# Patient Record
Sex: MALE | Race: WHITE | NOT HISPANIC OR LATINO | URBAN - METROPOLITAN AREA
[De-identification: names, ages, dates, MRNs, and addresses within clinical notes are randomized per-mention and may not be internally consistent; named-entity substitution may affect disease eponyms.]

---

## 2020-01-26 ASSESSMENT — MIFFLIN-ST. JEOR: SCORE: 1672.95

## 2020-01-27 ENCOUNTER — SURGERY - HEALTHEAST (OUTPATIENT)
Dept: SURGERY | Facility: CLINIC | Age: 75
End: 2020-01-27

## 2020-01-27 ENCOUNTER — SURGERY - HEALTHEAST (OUTPATIENT)
Dept: SURGERY | Facility: HOSPITAL | Age: 75
End: 2020-01-27

## 2020-01-27 ENCOUNTER — ANESTHESIA - HEALTHEAST (OUTPATIENT)
Dept: SURGERY | Facility: HOSPITAL | Age: 75
End: 2020-01-27

## 2020-01-27 DIAGNOSIS — N20.1 URETERAL CALCULUS, LEFT: ICD-10-CM

## 2020-01-27 ASSESSMENT — MIFFLIN-ST. JEOR: SCORE: 1705.16

## 2021-05-24 ENCOUNTER — RECORDS - HEALTHEAST (OUTPATIENT)
Dept: ADMINISTRATIVE | Facility: CLINIC | Age: 76
End: 2021-05-24

## 2021-06-01 ENCOUNTER — RECORDS - HEALTHEAST (OUTPATIENT)
Dept: ADMINISTRATIVE | Facility: CLINIC | Age: 76
End: 2021-06-01

## 2021-06-04 VITALS — BODY MASS INDEX: 29.1 KG/M2 | WEIGHT: 207.9 LBS | HEIGHT: 71 IN

## 2021-06-05 NOTE — ANESTHESIA POSTPROCEDURE EVALUATION
Patient: Timur Bullard  Procedure(s):  CYSTOSCOPY, CYSTOLITHOLAPAXY wit HOLMIUM LASER, LEFT RETROGRADE PYELOGRAM, LEFT URETEROSCOPY, STONE EXTRACTION, LEFT URETERAL STENT PLACEMENT (Left)  Anesthesia type: general    Patient location: PACU  Last vitals:   Vitals Value Taken Time   /76 1/27/2020 12:00 PM   Temp 36.5  C (97.7  F) 1/27/2020 11:42 AM   Pulse 84 1/27/2020 12:06 PM   Resp 15 1/27/2020 12:06 PM   SpO2 95 % 1/27/2020 12:06 PM   Vitals shown include unvalidated device data.  Post vital signs: stable  Level of consciousness: awake and responds to simple questions  Post-anesthesia pain: pain controlled  Post-anesthesia nausea and vomiting: no  Pulmonary: unassisted, return to baseline  Cardiovascular: stable and blood pressure at baseline  Hydration: adequate  Anesthetic events: no    QCDR Measures:  ASA# 11 - Alfreda-op Cardiac Arrest: ASA11B - Patient did NOT experience unanticipated cardiac arrest  ASA# 12 - Alfreda-op Mortality Rate: ASA12B - Patient did NOT die  ASA# 13 - PACU Re-Intubation Rate: ASA13B - Patient did NOT require a new airway mgmt  ASA# 10 - Composite Anes Safety: ASA10A - No serious adverse event    Additional Notes:

## 2021-06-05 NOTE — ANESTHESIA CARE TRANSFER NOTE
Last vitals:   Vitals:    01/27/20 1142   BP: 138/77   Pulse: 92   Resp: 16   Temp: 36.5  C (97.7  F)   SpO2: 100%     Patient's level of consciousness is drowsy  Spontaneous respirations: yes  Maintains airway independently: yes  Dentition unchanged: yes  Oropharynx: oropharynx clear of all foreign objects    QCDR Measures:  ASA# 20 - Surgical Safety Checklist: WHO surgical safety checklist completed prior to induction    PQRS# 430 - Adult PONV Prevention: 4558F - Pt received => 2 anti-emetic agents (different classes) preop & intraop  ASA# 8 - Peds PONV Prevention: 4558F - Pt received => 2 anti-emetic agents (different classes) preop & intraop  PQRS# 424 - Alfreda-op Temp Management: 4559F - At least one body temp DOCUMENTED => 35.5C or 95.9F within required timeframe  PQRS# 426 - PACU Transfer Protocol: - Transfer of care checklist used  ASA# 14 - Acute Post-op Pain: ASA14B - Patient did NOT experience pain >= 7 out of 10

## 2021-06-05 NOTE — ANESTHESIA PREPROCEDURE EVALUATION
Anesthesia Evaluation      Patient summary reviewed   No history of anesthetic complications     Airway   Mallampati: II  Comment: Overbite   Pulmonary - negative ROS and normal exam                          Cardiovascular   Exercise tolerance: > or = 4 METS  (+) hypertension, ,      Neuro/Psych - negative ROS     Endo/Other - negative ROS   Hypothyroidism: goiter.     GI/Hepatic/Renal - negative ROS           Dental - normal exam                          Anesthesia Plan  Planned anesthetic: general LMA    ASA 1   Induction: intravenous   Anesthetic plan and risks discussed with: patient  Anesthesia plan special considerations: antiemetics,   Post-op plan: routine recovery

## 2021-06-16 PROBLEM — N20.1 URETERAL CALCULUS, LEFT: Status: ACTIVE | Noted: 2020-01-26

## 2021-06-16 PROBLEM — E11.65 TYPE 2 DIABETES MELLITUS WITH HYPERGLYCEMIA, WITHOUT LONG-TERM CURRENT USE OF INSULIN (H): Status: ACTIVE | Noted: 2020-01-30

## 2021-06-16 PROBLEM — N17.9 AKI (ACUTE KIDNEY INJURY) (H): Status: ACTIVE | Noted: 2020-01-26

## 2021-10-29 DIAGNOSIS — Z11.59 ENCOUNTER FOR SCREENING FOR OTHER VIRAL DISEASES: ICD-10-CM

## 2021-11-15 ENCOUNTER — HOSPITAL ENCOUNTER (OUTPATIENT)
Facility: HOSPITAL | Age: 76
Discharge: HOME | End: 2021-11-15
Attending: UROLOGY | Admitting: UROLOGY
Payer: COMMERCIAL

## 2021-11-15 ENCOUNTER — APPOINTMENT (OUTPATIENT)
Dept: RADIOLOGY | Facility: HOSPITAL | Age: 76
End: 2021-11-15
Attending: UROLOGY
Payer: COMMERCIAL

## 2021-11-15 ENCOUNTER — ANESTHESIA EVENT (OUTPATIENT)
Dept: SURGERY | Facility: HOSPITAL | Age: 76
End: 2021-11-15
Payer: COMMERCIAL

## 2021-11-15 ENCOUNTER — ANESTHESIA (OUTPATIENT)
Dept: SURGERY | Facility: HOSPITAL | Age: 76
End: 2021-11-15
Payer: COMMERCIAL

## 2021-11-15 VITALS
OXYGEN SATURATION: 97 % | TEMPERATURE: 98.9 F | BODY MASS INDEX: 27.18 KG/M2 | WEIGHT: 194.9 LBS | RESPIRATION RATE: 24 BRPM | HEART RATE: 59 BPM | DIASTOLIC BLOOD PRESSURE: 73 MMHG | SYSTOLIC BLOOD PRESSURE: 152 MMHG

## 2021-11-15 DIAGNOSIS — N20.1 LEFT URETERAL STONE: Primary | ICD-10-CM

## 2021-11-15 DIAGNOSIS — N20.0 KIDNEY STONE: ICD-10-CM

## 2021-11-15 LAB
GLUCOSE BLDC GLUCOMTR-MCNC: 160 MG/DL (ref 70–99)
GLUCOSE BLDC GLUCOMTR-MCNC: 161 MG/DL (ref 70–99)

## 2021-11-15 PROCEDURE — 360N000083 HC SURGERY LEVEL 3 W/ FLUORO, PER MIN: Performed by: UROLOGY

## 2021-11-15 PROCEDURE — 272N000001 HC OR GENERAL SUPPLY STERILE: Performed by: UROLOGY

## 2021-11-15 PROCEDURE — C2617 STENT, NON-COR, TEM W/O DEL: HCPCS | Performed by: UROLOGY

## 2021-11-15 PROCEDURE — 250N000009 HC RX 250: Performed by: UROLOGY

## 2021-11-15 PROCEDURE — 258N000003 HC RX IP 258 OP 636: Performed by: ANESTHESIOLOGY

## 2021-11-15 PROCEDURE — 999N000182 XR SURGERY CARM FLUORO GREATER THAN 5 MIN

## 2021-11-15 PROCEDURE — 250N000011 HC RX IP 250 OP 636: Performed by: STUDENT IN AN ORGANIZED HEALTH CARE EDUCATION/TRAINING PROGRAM

## 2021-11-15 PROCEDURE — C1769 GUIDE WIRE: HCPCS | Performed by: UROLOGY

## 2021-11-15 PROCEDURE — 999N000141 HC STATISTIC PRE-PROCEDURE NURSING ASSESSMENT: Performed by: UROLOGY

## 2021-11-15 PROCEDURE — 82365 CALCULUS SPECTROSCOPY: CPT | Performed by: UROLOGY

## 2021-11-15 PROCEDURE — 710N000012 HC RECOVERY PHASE 2, PER MINUTE: Performed by: UROLOGY

## 2021-11-15 PROCEDURE — 370N000017 HC ANESTHESIA TECHNICAL FEE, PER MIN: Performed by: UROLOGY

## 2021-11-15 PROCEDURE — 250N000011 HC RX IP 250 OP 636: Performed by: NURSE PRACTITIONER

## 2021-11-15 PROCEDURE — C1894 INTRO/SHEATH, NON-LASER: HCPCS | Performed by: UROLOGY

## 2021-11-15 PROCEDURE — 710N000009 HC RECOVERY PHASE 1, LEVEL 1, PER MIN: Performed by: UROLOGY

## 2021-11-15 PROCEDURE — 82962 GLUCOSE BLOOD TEST: CPT

## 2021-11-15 PROCEDURE — 250N000025 HC SEVOFLURANE, PER MIN: Performed by: UROLOGY

## 2021-11-15 PROCEDURE — 258N000003 HC RX IP 258 OP 636: Performed by: STUDENT IN AN ORGANIZED HEALTH CARE EDUCATION/TRAINING PROGRAM

## 2021-11-15 PROCEDURE — 255N000002 HC RX 255 OP 636: Performed by: UROLOGY

## 2021-11-15 PROCEDURE — 250N000009 HC RX 250: Performed by: STUDENT IN AN ORGANIZED HEALTH CARE EDUCATION/TRAINING PROGRAM

## 2021-11-15 DEVICE — URETERAL STENT
Type: IMPLANTABLE DEVICE | Site: BLADDER | Status: FUNCTIONAL
Brand: PERCUFLEX™ PLUS

## 2021-11-15 RX ORDER — FENTANYL CITRATE 50 UG/ML
25 INJECTION, SOLUTION INTRAMUSCULAR; INTRAVENOUS
Status: DISCONTINUED | OUTPATIENT
Start: 2021-11-15 | End: 2021-11-15 | Stop reason: HOSPADM

## 2021-11-15 RX ORDER — NALOXONE HYDROCHLORIDE 0.4 MG/ML
0.2 INJECTION, SOLUTION INTRAMUSCULAR; INTRAVENOUS; SUBCUTANEOUS
Status: DISCONTINUED | OUTPATIENT
Start: 2021-11-15 | End: 2021-11-15 | Stop reason: HOSPADM

## 2021-11-15 RX ORDER — SODIUM CHLORIDE, SODIUM LACTATE, POTASSIUM CHLORIDE, CALCIUM CHLORIDE 600; 310; 30; 20 MG/100ML; MG/100ML; MG/100ML; MG/100ML
INJECTION, SOLUTION INTRAVENOUS CONTINUOUS
Status: DISCONTINUED | OUTPATIENT
Start: 2021-11-15 | End: 2021-11-15 | Stop reason: HOSPADM

## 2021-11-15 RX ORDER — ONDANSETRON 4 MG/1
4 TABLET, ORALLY DISINTEGRATING ORAL EVERY 30 MIN PRN
Status: DISCONTINUED | OUTPATIENT
Start: 2021-11-15 | End: 2021-11-15 | Stop reason: HOSPADM

## 2021-11-15 RX ORDER — HYDROMORPHONE HCL IN WATER/PF 6 MG/30 ML
0.2 PATIENT CONTROLLED ANALGESIA SYRINGE INTRAVENOUS EVERY 5 MIN PRN
Status: DISCONTINUED | OUTPATIENT
Start: 2021-11-15 | End: 2021-11-15 | Stop reason: HOSPADM

## 2021-11-15 RX ORDER — HYDROCODONE BITARTRATE AND ACETAMINOPHEN 5; 325 MG/1; MG/1
1 TABLET ORAL EVERY 4 HOURS PRN
Status: DISCONTINUED | OUTPATIENT
Start: 2021-11-15 | End: 2021-11-15 | Stop reason: HOSPADM

## 2021-11-15 RX ORDER — HYDROCODONE BITARTRATE AND ACETAMINOPHEN 5; 325 MG/1; MG/1
1 TABLET ORAL EVERY 4 HOURS PRN
Qty: 10 TABLET | Refills: 0 | Status: ON HOLD | OUTPATIENT
Start: 2021-11-15 | End: 2024-07-18

## 2021-11-15 RX ORDER — EPHEDRINE SULFATE 50 MG/ML
INJECTION, SOLUTION INTRAMUSCULAR; INTRAVENOUS; SUBCUTANEOUS PRN
Status: DISCONTINUED | OUTPATIENT
Start: 2021-11-15 | End: 2021-11-15

## 2021-11-15 RX ORDER — PHENAZOPYRIDINE HYDROCHLORIDE 200 MG/1
200 TABLET, FILM COATED ORAL 3 TIMES DAILY PRN
Qty: 30 TABLET | Refills: 1 | Status: ON HOLD | OUTPATIENT
Start: 2021-11-15 | End: 2024-07-18

## 2021-11-15 RX ORDER — METOPROLOL SUCCINATE 25 MG/1
25 TABLET, EXTENDED RELEASE ORAL DAILY
COMMUNITY

## 2021-11-15 RX ORDER — ACETAMINOPHEN 325 MG/1
650 TABLET ORAL ONCE
Status: DISCONTINUED | OUTPATIENT
Start: 2021-11-15 | End: 2021-11-15 | Stop reason: HOSPADM

## 2021-11-15 RX ORDER — PROPOFOL 10 MG/ML
INJECTION, EMULSION INTRAVENOUS PRN
Status: DISCONTINUED | OUTPATIENT
Start: 2021-11-15 | End: 2021-11-15

## 2021-11-15 RX ORDER — NALOXONE HYDROCHLORIDE 0.4 MG/ML
0.4 INJECTION, SOLUTION INTRAMUSCULAR; INTRAVENOUS; SUBCUTANEOUS
Status: DISCONTINUED | OUTPATIENT
Start: 2021-11-15 | End: 2021-11-15 | Stop reason: HOSPADM

## 2021-11-15 RX ORDER — CEFAZOLIN SODIUM 2 G/100ML
2 INJECTION, SOLUTION INTRAVENOUS SEE ADMIN INSTRUCTIONS
Status: DISCONTINUED | OUTPATIENT
Start: 2021-11-15 | End: 2021-11-15 | Stop reason: HOSPADM

## 2021-11-15 RX ORDER — DEXAMETHASONE SODIUM PHOSPHATE 4 MG/ML
INJECTION, SOLUTION INTRA-ARTICULAR; INTRALESIONAL; INTRAMUSCULAR; INTRAVENOUS; SOFT TISSUE PRN
Status: DISCONTINUED | OUTPATIENT
Start: 2021-11-15 | End: 2021-11-15

## 2021-11-15 RX ORDER — MEPERIDINE HYDROCHLORIDE 25 MG/ML
12.5 INJECTION INTRAMUSCULAR; INTRAVENOUS; SUBCUTANEOUS
Status: DISCONTINUED | OUTPATIENT
Start: 2021-11-15 | End: 2021-11-15 | Stop reason: HOSPADM

## 2021-11-15 RX ORDER — LIDOCAINE HYDROCHLORIDE 20 MG/ML
JELLY TOPICAL 2 TIMES DAILY
Qty: 1 ML | Refills: 3 | Status: ON HOLD | OUTPATIENT
Start: 2021-11-15 | End: 2024-07-18

## 2021-11-15 RX ORDER — FENTANYL CITRATE 50 UG/ML
25 INJECTION, SOLUTION INTRAMUSCULAR; INTRAVENOUS EVERY 5 MIN PRN
Status: DISCONTINUED | OUTPATIENT
Start: 2021-11-15 | End: 2021-11-15 | Stop reason: HOSPADM

## 2021-11-15 RX ORDER — LIDOCAINE 40 MG/G
CREAM TOPICAL
Status: DISCONTINUED | OUTPATIENT
Start: 2021-11-15 | End: 2021-11-15 | Stop reason: HOSPADM

## 2021-11-15 RX ORDER — ONDANSETRON 2 MG/ML
4 INJECTION INTRAMUSCULAR; INTRAVENOUS EVERY 30 MIN PRN
Status: DISCONTINUED | OUTPATIENT
Start: 2021-11-15 | End: 2021-11-15 | Stop reason: HOSPADM

## 2021-11-15 RX ORDER — CEFAZOLIN SODIUM 2 G/100ML
2 INJECTION, SOLUTION INTRAVENOUS
Status: COMPLETED | OUTPATIENT
Start: 2021-11-15 | End: 2021-11-15

## 2021-11-15 RX ORDER — FENTANYL CITRATE 50 UG/ML
INJECTION, SOLUTION INTRAMUSCULAR; INTRAVENOUS PRN
Status: DISCONTINUED | OUTPATIENT
Start: 2021-11-15 | End: 2021-11-15

## 2021-11-15 RX ORDER — GLYCOPYRROLATE 0.2 MG/ML
INJECTION, SOLUTION INTRAMUSCULAR; INTRAVENOUS PRN
Status: DISCONTINUED | OUTPATIENT
Start: 2021-11-15 | End: 2021-11-15

## 2021-11-15 RX ORDER — LIDOCAINE HYDROCHLORIDE 20 MG/ML
INJECTION, SOLUTION INFILTRATION; PERINEURAL PRN
Status: DISCONTINUED | OUTPATIENT
Start: 2021-11-15 | End: 2021-11-15

## 2021-11-15 RX ORDER — ONDANSETRON 2 MG/ML
INJECTION INTRAMUSCULAR; INTRAVENOUS PRN
Status: DISCONTINUED | OUTPATIENT
Start: 2021-11-15 | End: 2021-11-15

## 2021-11-15 RX ADMIN — ONDANSETRON 4 MG: 2 INJECTION INTRAMUSCULAR; INTRAVENOUS at 11:55

## 2021-11-15 RX ADMIN — SODIUM CHLORIDE, POTASSIUM CHLORIDE, SODIUM LACTATE AND CALCIUM CHLORIDE: 600; 310; 30; 20 INJECTION, SOLUTION INTRAVENOUS at 10:08

## 2021-11-15 RX ADMIN — DEXAMETHASONE SODIUM PHOSPHATE 4 MG: 4 INJECTION, SOLUTION INTRA-ARTICULAR; INTRALESIONAL; INTRAMUSCULAR; INTRAVENOUS; SOFT TISSUE at 11:19

## 2021-11-15 RX ADMIN — MIDAZOLAM HYDROCHLORIDE 2 MG: 1 INJECTION, SOLUTION INTRAMUSCULAR; INTRAVENOUS at 11:07

## 2021-11-15 RX ADMIN — FENTANYL CITRATE 50 MCG: 50 INJECTION, SOLUTION INTRAMUSCULAR; INTRAVENOUS at 11:45

## 2021-11-15 RX ADMIN — LIDOCAINE HYDROCHLORIDE 60 MG: 20 INJECTION, SOLUTION INFILTRATION; PERINEURAL at 11:19

## 2021-11-15 RX ADMIN — GLYCOPYRROLATE 0.3 MG: 0.2 INJECTION, SOLUTION INTRAMUSCULAR; INTRAVENOUS at 11:30

## 2021-11-15 RX ADMIN — PHENYLEPHRINE HYDROCHLORIDE 100 MCG: 10 INJECTION INTRAVENOUS at 11:24

## 2021-11-15 RX ADMIN — Medication 10 MG: at 11:36

## 2021-11-15 RX ADMIN — PHENYLEPHRINE HYDROCHLORIDE 100 MCG: 10 INJECTION INTRAVENOUS at 11:56

## 2021-11-15 RX ADMIN — FENTANYL CITRATE 100 MCG: 50 INJECTION, SOLUTION INTRAMUSCULAR; INTRAVENOUS at 11:19

## 2021-11-15 RX ADMIN — PROPOFOL 200 MG: 10 INJECTION, EMULSION INTRAVENOUS at 11:19

## 2021-11-15 RX ADMIN — PHENYLEPHRINE HYDROCHLORIDE 50 MCG: 10 INJECTION INTRAVENOUS at 11:31

## 2021-11-15 RX ADMIN — CEFAZOLIN SODIUM 2 G: 2 INJECTION, SOLUTION INTRAVENOUS at 11:16

## 2021-11-15 NOTE — ANESTHESIA POSTPROCEDURE EVALUATION
Patient: Timur Bullard    Procedure: Procedure(s):  CYSTOSCOPY, REMOVAL OF BLADDER STONES  RIGHT URETEROSCOPY, WITH HOLMIUM LASER LITHOTRIPSY, STONE EXTRACTION AND DOUBLE J  URETERAL STENT PLACEMENT RIGHT       Diagnosis:Calculus of kidney [N20.0]  Diagnosis Additional Information: No value filed.    Anesthesia Type:  General    Note:  Disposition: Outpatient   Postop Pain Control: Uneventful            Sign Out: Well controlled pain   PONV: No   Neuro/Psych: Uneventful            Sign Out: Acceptable/Baseline neuro status   Airway/Respiratory: Uneventful            Sign Out: Acceptable/Baseline resp. status   CV/Hemodynamics: Uneventful            Sign Out: Acceptable CV status; No obvious hypovolemia; No obvious fluid overload   Other NRE: NONE   DID A NON-ROUTINE EVENT OCCUR? No           Last vitals:  Vitals Value Taken Time   /78 11/15/21 1315   Temp 37.2  C (98.9  F) 11/15/21 1227   Pulse 54 11/15/21 1325   Resp 21 11/15/21 1319   SpO2 98 % 11/15/21 1325   Vitals shown include unvalidated device data.    Electronically Signed By: Carl Michelle MD  November 15, 2021  3:29 PM

## 2021-11-15 NOTE — ANESTHESIA CARE TRANSFER NOTE
Patient: Timur Bullard    Procedure: Procedure(s):  CYSTOSCOPY, REMOVAL OF BLADDER STONES  RIGHT URETEROSCOPY, WITH HOLMIUM LASER LITHOTRIPSY AND DOUBLE J  URETERAL STENT PLACEMENT RIGHT       Diagnosis: Calculus of kidney [N20.0]  Diagnosis Additional Information: No value filed.    Anesthesia Type:   General     Note:BP (!) 156/78   Pulse 74   Temp 37.2  C (98.9  F) (Temporal)   Resp 18   Wt 88.4 kg (194 lb 14.4 oz)   SpO2 99%   BMI 27.18 kg/m        Oropharynx: oropharynx clear of all foreign objects  Level of Consciousness: awake  Oxygen Supplementation: face mask  Level of Supplemental Oxygen (L/min / FiO2): 8  Independent Airway: airway patency satisfactory and stable  Dentition: dentition unchanged  Vital Signs Stable: post-procedure vital signs reviewed and stable  Report to RN Given: handoff report given  Patient transferred to: PACU    Handoff Report: Identifed the Patient, Identified the Reponsible Provider, Reviewed the pertinent medical history, Discussed the surgical course, Reviewed Intra-OP anesthesia mangement and issues during anesthesia, Set expectations for post-procedure period and Allowed opportunity for questions and acknowledgement of understanding      Vitals:  Vitals Value Taken Time   /78 11/15/21 1227   Temp 37.2  C (98.9  F) 11/15/21 1227   Pulse 70 11/15/21 1230   Resp 9 11/15/21 1230   SpO2 100 % 11/15/21 1230   Vitals shown include unvalidated device data.    Electronically Signed By: Darline Perry CRNA, KINSEY BENITEZ  November 15, 2021  12:30 PM

## 2021-11-15 NOTE — OP NOTE
Date of surgery: 11/15/2021    Location: [facility] [place]    Operating room: 6    Surgeon: Dr. Adithya Quinones.     Assistant: None    Anesthesia: General    Preoperative diagnosis: Bladder calculi and right renal calculi    Postoperative diagnosis: Same    Procedure: Cystoscopy, evacuation of bladder stones, right retrograde pyelogram, right ureteroscopy, holmium laser lithotripsy, right ureteral stent placement    Operative indication: Timur Bullard is a 76 year old male with a known bladder calculus and a diverticulum and a 9 mm right renal calculus who presents approximately 5 months after his most recent imaging for stone clearance.    Operative findings: There are 4 stones in a bladder diverticulum to the right of midline.  These are evacuated through the scope.  There were 2 stones in the right kidney, both of which required laser lithotripsy.  The stones are dusted.  Fragments larger than a millimeter are removed.    Operative procedure: The patient was brought to the operating room.  General anesthesia was administered.  The patient was placed in lithotomy position and prepared and draped in sterile fashion.  I introduced a 22 Somali cystoscope per urethra and into the bladder.  There are no urethral strictures.  The prostate is obstructive with bilobar hypertrophy.  There are several small diverticula in the bladder.  On the right side of the bladder, 1 of these diverticula contains 4 calculi.  I was able to remove 3 stones through the 22 Somali sheath.  I removed the scope and exchanged for a 25 Somali sheath.  I was then able to remove the largest of the bladder stones through the sheath.  At this point I introduced an 8 Somali coaxial catheter into the right ureteral orifice and performed a retrograde pyelogram.  There is no hydronephrosis.  There appeared to be faintly radiopaque calcifications in the right kidney.  The proximal ureter is fairly narrow.  A [employer] wire was advanced into the kidney and  the ureter is dilated with the 8 Israeli obturator and the 10 Israeli sheath.  I then introduced the 12 Israeli obturator and the 14 Israeli, 36 cm ureteral access sheath.  A second wire was placed and one of the wires is exteriorized by reinserting the sheath.  The other wire was removed.  Flexible ureteroscopy was undertaken.  The proximal ureter is quite narrow but does allow passage of the scope alongside the safety wire.  In the upper pole, I identified 2 stones in the same calyx.  I used the holmium laser with settings of 0.2 and 50 to dust the stone.  While the stone dusted reasonably well, there were still some larger fragments and these were removed with the stone basket.  At the completion of the procedure, visualization is reasonable and I do not see any stones larger than the diameter of the wire.  The sheath is removed and the ureter is inspected.  There are no stones or stone fragments.  The collecting system is filled with contrast and there is no extravasation.  There are no filling defects.  A 6 Israeli by 26 cm double-J stent is advanced over the wire.  It coils into the lower pole as seen by fluoroscopy and into the bladder as seen by direct vision.  The bladder was emptied and the procedure terminated.    Drains: Right ureteral stent    Specimens: Bladder stones and right renal stones    Estimated blood loss: 5 mL    Complications: None    Adithya Quinones MD

## 2021-11-15 NOTE — ANESTHESIA CARE TRANSFER NOTE
Patient: Timur Bullard    Procedure: Procedure(s):  CYSTOSCOPY, REMOVAL OF BLADDER STONES  RIGHT URETEROSCOPY, WITH HOLMIUM LASER LITHOTRIPSY AND DOUBLE J  URETERAL STENT PLACEMENT RIGHT       Diagnosis: Calculus of kidney [N20.0]  Diagnosis Additional Information: No value filed.    Anesthesia Type:   General     Note:  Anesthesia Care Transfer Notewriter  Vitals:  Vitals Value Taken Time   /79 11/15/21 1230   Temp 37.2  C (98.9  F) 11/15/21 1227   Pulse 70 11/15/21 1230   Resp 9 11/15/21 1230   SpO2 100 % 11/15/21 1230   Vitals shown include unvalidated device data.    Electronically Signed By: Darline Perry CRNA, APRN CRNA  November 15, 2021  12:31 PM

## 2021-11-15 NOTE — ANESTHESIA PREPROCEDURE EVALUATION
"Anesthesia Pre-Procedure Evaluation    Patient: Timur Bullard   MRN: [id number] : 1945        Preoperative Diagnosis: Calculus of kidney [N20.0]    Procedure : Procedure(s):  CYSTOSCOPY, CYSTOLITHOLAPAXY  RIGHT URETEROSCOPY, WITH HOLMIUM LASER LITHOTRIPSY AND DOUBLE J  URETERAL STENT PLACEMENT          Past Medical History:   Diagnosis Date     Hypertension      Type 2 diabetes mellitus with hyperglycemia, without long-term current use of insulin (H) 2020      Past Surgical History:   Procedure Laterality Date     CYSTOSCOPY  2015     CYSTOSCOPY  11/15/2021    cystolitholapaxy, right stent     KIDNEY SURGERY Right       Allergies   Allergen Reactions     Ciprofloxacin Other (See Comments)     \"I get loopy\"     Gadolinium-Containing Contrast Media [Gadolinium Derivatives] Hives     Percocet [Oxycodone-Acetaminophen] Other (See Comments)     Delusional       Social History     Tobacco Use     Smoking status: Former Smoker     Quit date: 1978     Years since quittin.9     Smokeless tobacco: Never Used   Substance Use Topics     Alcohol use: Not Currently     Comment: Alcoholic Drinks/day: Occasionally      Wt Readings from Last 1 Encounters:   11/15/21 88.4 kg (194 lb 14.4 oz)        Anesthesia Evaluation   Pt has had prior anesthetic.     History of anesthetic complications (very sore throat x 2 days)       ROS/MED HX  ENT/Pulmonary:    (-) sleep apnea and recent URI   Neurologic:  - neg neurologic ROS     Cardiovascular:     (+) hypertension-----    METS/Exercise Tolerance:     Hematologic:       Musculoskeletal:       GI/Hepatic:       Renal/Genitourinary:     (+) renal disease, type: ARF,     Endo:     (+) type I DM,     Psychiatric/Substance Use:       Infectious Disease:       Malignancy:       Other:            Physical Exam    Airway        Mallampati: II   TM distance: > 3 FB   Neck ROM: full   Mouth opening: > 3 cm    Respiratory Devices and Support         Dental       (+) " caps      Cardiovascular   cardiovascular exam normal          Pulmonary   pulmonary exam normal                OUTSIDE LABS:  CBC:   Lab Results   Component Value Date    WBC 10.3 01/28/2020    HGB 12.4 (L) 01/28/2020    HCT 36.7 (L) 01/28/2020     01/28/2020     BMP:   Lab Results   Component Value Date     01/28/2020     01/27/2020    POTASSIUM 4.3 01/28/2020    POTASSIUM 4.7 01/27/2020    CHLORIDE 109 (H) 01/28/2020    CHLORIDE 105 01/27/2020    CO2 23 01/28/2020    CO2 25 01/27/2020    BUN 17 01/28/2020    BUN 29 (H) 01/27/2020    CR 1.08 01/28/2020    CR 1.52 (H) 01/27/2020     (H) 11/15/2021     (H) 01/28/2020     COAGS: No results found for: PTT, INR, FIBR  POC: No results found for: BGM, HCG, HCGS  HEPATIC: No results found for: ALBUMIN, PROTTOTAL, ALT, AST, GGT, ALKPHOS, BILITOTAL, BILIDIRECT, FARIDA  OTHER:   Lab Results   Component Value Date    A1C 9.2 (H) 01/28/2020    SUSAN 8.5 01/28/2020    MAG 1.8 01/28/2020    TSH 0.99 05/02/2008    T4 0.96 05/02/2008    T3 172 05/02/2008       Anesthesia Plan    ASA Status:  3      Anesthesia Type: General.     - Airway: LMA              Consents    Anesthesia Plan(s) and associated risks, benefits, and realistic alternatives discussed. Questions answered and patient/representative(s) expressed understanding.     - Discussed with:  Patient      - Patient is DNR/DNI Status: No         Postoperative Care    Pain management: IV analgesics, Oral pain medications, Multi-modal analgesia.   PONV prophylaxis: Ondansetron (or other 5HT-3), Dexamethasone or Solumedrol     Comments:                Carl Michelle MD

## 2021-11-15 NOTE — ANESTHESIA PROCEDURE NOTES
Airway       Patient location during procedure: OR  Staff -        Anesthesiologist:  Carl Michelle MD       CRNA: Brooke Viramontes APRN CRNA       Performed By: CRNA  Consent for Airway        Urgency: elective  Indications and Patient Condition       Indications for airway management: chauncey-procedural       Induction type:intravenous       Mask difficulty assessment: 0 - not attempted    Final Airway Details       Final airway type: supraglottic airway    Supraglottic Airway Details        Type: LMA       LMA size: 5    Post intubation assessment        Placement verified by: capnometry, equal breath sounds and chest rise        Number of attempts at approach: 1       Number of other approaches attempted: 0       Secured with: silk tape       Ease of procedure: easy       Dentition: Intact and Unchanged

## 2021-11-16 LAB
APPEARANCE STONE: NORMAL
APPEARANCE STONE: NORMAL
COMPN STONE: NORMAL
COMPN STONE: NORMAL
SPECIMEN WT: 35 MG
SPECIMEN WT: 485 MG

## 2023-03-29 ENCOUNTER — TRANSCRIBE ORDERS (OUTPATIENT)
Dept: OTHER | Age: 78
End: 2023-03-29

## 2023-06-19 ENCOUNTER — TRANSCRIBE ORDERS (OUTPATIENT)
Dept: OTHER | Age: 78
End: 2023-06-19

## 2023-06-19 DIAGNOSIS — Z95.5 S/P INSERTION OF NON-DRUG ELUTING CORONARY ARTERY STENT: Primary | ICD-10-CM

## 2024-07-18 ENCOUNTER — HOSPITAL ENCOUNTER (INPATIENT)
Facility: HOSPITAL | Age: 79
LOS: 3 days | Discharge: HOME | DRG: 390 | End: 2024-07-21
Attending: STUDENT IN AN ORGANIZED HEALTH CARE EDUCATION/TRAINING PROGRAM | Admitting: INTERNAL MEDICINE
Payer: COMMERCIAL

## 2024-07-18 DIAGNOSIS — R10.13 DYSPEPSIA: Primary | ICD-10-CM

## 2024-07-18 DIAGNOSIS — R19.7 DIARRHEA, UNSPECIFIED TYPE: ICD-10-CM

## 2024-07-18 PROBLEM — K56.609 SBO (SMALL BOWEL OBSTRUCTION) (H): Status: ACTIVE | Noted: 2024-07-18

## 2024-07-18 PROCEDURE — 120N000001 HC R&B MED SURG/OB

## 2024-07-18 PROCEDURE — 99418 PROLNG IP/OBS E/M EA 15 MIN: CPT | Performed by: INTERNAL MEDICINE

## 2024-07-18 PROCEDURE — 250N000011 HC RX IP 250 OP 636: Performed by: INTERNAL MEDICINE

## 2024-07-18 PROCEDURE — 250N000011 HC RX IP 250 OP 636: Performed by: STUDENT IN AN ORGANIZED HEALTH CARE EDUCATION/TRAINING PROGRAM

## 2024-07-18 PROCEDURE — 99223 1ST HOSP IP/OBS HIGH 75: CPT | Performed by: INTERNAL MEDICINE

## 2024-07-18 RX ORDER — ONDANSETRON 2 MG/ML
4 INJECTION INTRAMUSCULAR; INTRAVENOUS EVERY 6 HOURS PRN
Status: DISCONTINUED | OUTPATIENT
Start: 2024-07-18 | End: 2024-07-20

## 2024-07-18 RX ORDER — OMEGA-3/DHA/EPA/FISH OIL 60 MG-90MG
500 CAPSULE ORAL DAILY
Status: ON HOLD | COMMUNITY
End: 2024-07-21

## 2024-07-18 RX ORDER — LIDOCAINE 40 MG/G
CREAM TOPICAL
Status: DISCONTINUED | OUTPATIENT
Start: 2024-07-18 | End: 2024-07-21 | Stop reason: HOSPADM

## 2024-07-18 RX ORDER — AMOXICILLIN 250 MG
2 CAPSULE ORAL 2 TIMES DAILY PRN
Status: DISCONTINUED | OUTPATIENT
Start: 2024-07-18 | End: 2024-07-21 | Stop reason: HOSPADM

## 2024-07-18 RX ORDER — HYDRALAZINE HYDROCHLORIDE 20 MG/ML
10 INJECTION INTRAMUSCULAR; INTRAVENOUS EVERY 6 HOURS PRN
Status: DISCONTINUED | OUTPATIENT
Start: 2024-07-18 | End: 2024-07-19

## 2024-07-18 RX ORDER — NITROGLYCERIN 0.4 MG/1
0.4 TABLET SUBLINGUAL EVERY 5 MIN PRN
Status: DISCONTINUED | OUTPATIENT
Start: 2024-07-18 | End: 2024-07-21 | Stop reason: HOSPADM

## 2024-07-18 RX ORDER — POTASSIUM CITRATE 10 MEQ/1
20 TABLET, EXTENDED RELEASE ORAL 2 TIMES DAILY
Status: ON HOLD | COMMUNITY
End: 2024-07-21

## 2024-07-18 RX ORDER — LORAZEPAM 2 MG/ML
0.5 INJECTION INTRAMUSCULAR ONCE
Status: COMPLETED | OUTPATIENT
Start: 2024-07-18 | End: 2024-07-18

## 2024-07-18 RX ORDER — CALCIUM CARBONATE 500 MG/1
1000 TABLET, CHEWABLE ORAL 4 TIMES DAILY PRN
Status: DISCONTINUED | OUTPATIENT
Start: 2024-07-18 | End: 2024-07-21 | Stop reason: HOSPADM

## 2024-07-18 RX ORDER — PROCHLORPERAZINE MALEATE 5 MG
5 TABLET ORAL EVERY 6 HOURS PRN
Status: DISCONTINUED | OUTPATIENT
Start: 2024-07-18 | End: 2024-07-21 | Stop reason: HOSPADM

## 2024-07-18 RX ORDER — AMOXICILLIN 250 MG
1 CAPSULE ORAL 2 TIMES DAILY PRN
Status: DISCONTINUED | OUTPATIENT
Start: 2024-07-18 | End: 2024-07-21 | Stop reason: HOSPADM

## 2024-07-18 RX ORDER — NALOXONE HYDROCHLORIDE 0.4 MG/ML
0.2 INJECTION, SOLUTION INTRAMUSCULAR; INTRAVENOUS; SUBCUTANEOUS
Status: DISCONTINUED | OUTPATIENT
Start: 2024-07-18 | End: 2024-07-21 | Stop reason: HOSPADM

## 2024-07-18 RX ORDER — NITROGLYCERIN 0.4 MG/1
0.4 TABLET SUBLINGUAL EVERY 5 MIN PRN
COMMUNITY

## 2024-07-18 RX ORDER — LEVOCETIRIZINE DIHYDROCHLORIDE 5 MG/1
5 TABLET, FILM COATED ORAL
Status: ON HOLD | COMMUNITY
End: 2024-07-21

## 2024-07-18 RX ORDER — VERAPAMIL HYDROCHLORIDE 120 MG/1
120 CAPSULE, EXTENDED RELEASE ORAL EVERY MORNING
COMMUNITY

## 2024-07-18 RX ORDER — DEXTROSE, SODIUM CHLORIDE, SODIUM LACTATE, POTASSIUM CHLORIDE, AND CALCIUM CHLORIDE 5; .6; .31; .03; .02 G/100ML; G/100ML; G/100ML; G/100ML; G/100ML
INJECTION, SOLUTION INTRAVENOUS CONTINUOUS
Status: DISCONTINUED | OUTPATIENT
Start: 2024-07-18 | End: 2024-07-19

## 2024-07-18 RX ORDER — PROCHLORPERAZINE 25 MG
12.5 SUPPOSITORY, RECTAL RECTAL EVERY 12 HOURS PRN
Status: DISCONTINUED | OUTPATIENT
Start: 2024-07-18 | End: 2024-07-21 | Stop reason: HOSPADM

## 2024-07-18 RX ORDER — HYDRALAZINE HYDROCHLORIDE 20 MG/ML
10 INJECTION INTRAMUSCULAR; INTRAVENOUS EVERY 4 HOURS PRN
Status: DISCONTINUED | OUTPATIENT
Start: 2024-07-18 | End: 2024-07-21 | Stop reason: HOSPADM

## 2024-07-18 RX ORDER — ONDANSETRON 2 MG/ML
4 INJECTION INTRAMUSCULAR; INTRAVENOUS EVERY 6 HOURS PRN
Status: DISCONTINUED | OUTPATIENT
Start: 2024-07-18 | End: 2024-07-21 | Stop reason: HOSPADM

## 2024-07-18 RX ORDER — HYDROMORPHONE HCL IN WATER/PF 6 MG/30 ML
0.2 PATIENT CONTROLLED ANALGESIA SYRINGE INTRAVENOUS
Status: DISCONTINUED | OUTPATIENT
Start: 2024-07-18 | End: 2024-07-21 | Stop reason: HOSPADM

## 2024-07-18 RX ORDER — ONDANSETRON 4 MG/1
4 TABLET, ORALLY DISINTEGRATING ORAL EVERY 6 HOURS PRN
Status: DISCONTINUED | OUTPATIENT
Start: 2024-07-18 | End: 2024-07-21 | Stop reason: HOSPADM

## 2024-07-18 RX ORDER — NALOXONE HYDROCHLORIDE 0.4 MG/ML
0.4 INJECTION, SOLUTION INTRAMUSCULAR; INTRAVENOUS; SUBCUTANEOUS
Status: DISCONTINUED | OUTPATIENT
Start: 2024-07-18 | End: 2024-07-21 | Stop reason: HOSPADM

## 2024-07-18 RX ORDER — HYDROMORPHONE HCL IN WATER/PF 6 MG/30 ML
0.4 PATIENT CONTROLLED ANALGESIA SYRINGE INTRAVENOUS
Status: DISCONTINUED | OUTPATIENT
Start: 2024-07-18 | End: 2024-07-21 | Stop reason: HOSPADM

## 2024-07-18 RX ORDER — ALBUTEROL SULFATE 90 UG/1
1-2 AEROSOL, METERED RESPIRATORY (INHALATION) EVERY 4 HOURS PRN
Status: DISCONTINUED | OUTPATIENT
Start: 2024-07-18 | End: 2024-07-21 | Stop reason: HOSPADM

## 2024-07-18 RX ADMIN — HYDRALAZINE HYDROCHLORIDE 10 MG: 20 INJECTION INTRAMUSCULAR; INTRAVENOUS at 21:24

## 2024-07-18 RX ADMIN — FAMOTIDINE 20 MG: 10 INJECTION, SOLUTION INTRAVENOUS at 22:14

## 2024-07-18 RX ADMIN — SODIUM CHLORIDE, SODIUM LACTATE, POTASSIUM CHLORIDE, CALCIUM CHLORIDE AND DEXTROSE MONOHYDRATE: 5; 600; 310; 30; 20 INJECTION, SOLUTION INTRAVENOUS at 22:20

## 2024-07-18 RX ADMIN — PROCHLORPERAZINE EDISYLATE 5 MG: 5 INJECTION INTRAMUSCULAR; INTRAVENOUS at 21:15

## 2024-07-18 RX ADMIN — LORAZEPAM 0.5 MG: 2 INJECTION INTRAMUSCULAR; INTRAVENOUS at 23:33

## 2024-07-18 ASSESSMENT — ACTIVITIES OF DAILY LIVING (ADL)
ADLS_ACUITY_SCORE: 35

## 2024-07-19 ENCOUNTER — APPOINTMENT (OUTPATIENT)
Dept: CT IMAGING | Facility: HOSPITAL | Age: 79
DRG: 390 | End: 2024-07-19
Attending: INTERNAL MEDICINE
Payer: COMMERCIAL

## 2024-07-19 LAB
ALBUMIN SERPL BCG-MCNC: 4.1 G/DL (ref 3.5–5.2)
ALP SERPL-CCNC: 65 U/L (ref 40–150)
ALT SERPL W P-5'-P-CCNC: 27 U/L (ref 0–70)
ANION GAP SERPL CALCULATED.3IONS-SCNC: 11 MMOL/L (ref 7–15)
AST SERPL W P-5'-P-CCNC: 19 U/L (ref 0–45)
BASOPHILS # BLD AUTO: 0 10E3/UL (ref 0–0.2)
BASOPHILS NFR BLD AUTO: 0 %
BILIRUB SERPL-MCNC: 1.8 MG/DL
BUN SERPL-MCNC: 18.3 MG/DL (ref 8–23)
CALCIUM SERPL-MCNC: 8.7 MG/DL (ref 8.8–10.4)
CHLORIDE SERPL-SCNC: 104 MMOL/L (ref 98–107)
CREAT SERPL-MCNC: 0.91 MG/DL (ref 0.67–1.17)
EGFRCR SERPLBLD CKD-EPI 2021: 87 ML/MIN/1.73M2
EOSINOPHIL # BLD AUTO: 0.1 10E3/UL (ref 0–0.7)
EOSINOPHIL NFR BLD AUTO: 1 %
ERYTHROCYTE [DISTWIDTH] IN BLOOD BY AUTOMATED COUNT: 13.9 % (ref 10–15)
GLUCOSE BLDC GLUCOMTR-MCNC: 153 MG/DL (ref 70–99)
GLUCOSE BLDC GLUCOMTR-MCNC: 169 MG/DL (ref 70–99)
GLUCOSE BLDC GLUCOMTR-MCNC: 169 MG/DL (ref 70–99)
GLUCOSE BLDC GLUCOMTR-MCNC: 215 MG/DL (ref 70–99)
GLUCOSE SERPL-MCNC: 196 MG/DL (ref 70–99)
HAV IGM SERPL QL IA: NONREACTIVE
HBV CORE IGM SERPL QL IA: NONREACTIVE
HBV SURFACE AG SERPL QL IA: NONREACTIVE
HCO3 SERPL-SCNC: 24 MMOL/L (ref 22–29)
HCT VFR BLD AUTO: 42.3 % (ref 40–53)
HCV AB SERPL QL IA: NONREACTIVE
HGB BLD-MCNC: 14.2 G/DL (ref 13.3–17.7)
IMM GRANULOCYTES # BLD: 0 10E3/UL
IMM GRANULOCYTES NFR BLD: 0 %
LYMPHOCYTES # BLD AUTO: 0.6 10E3/UL (ref 0.8–5.3)
LYMPHOCYTES NFR BLD AUTO: 6 %
MAGNESIUM SERPL-MCNC: 1.9 MG/DL (ref 1.7–2.3)
MCH RBC QN AUTO: 27.7 PG (ref 26.5–33)
MCHC RBC AUTO-ENTMCNC: 33.6 G/DL (ref 31.5–36.5)
MCV RBC AUTO: 83 FL (ref 78–100)
MONOCYTES # BLD AUTO: 1.3 10E3/UL (ref 0–1.3)
MONOCYTES NFR BLD AUTO: 14 %
NEUTROPHILS # BLD AUTO: 7.6 10E3/UL (ref 1.6–8.3)
NEUTROPHILS NFR BLD AUTO: 79 %
NRBC # BLD AUTO: 0 10E3/UL
NRBC BLD AUTO-RTO: 0 /100
PLATELET # BLD AUTO: ABNORMAL 10*3/UL
POTASSIUM SERPL-SCNC: 4.2 MMOL/L (ref 3.4–5.3)
PROT SERPL-MCNC: 6.4 G/DL (ref 6.4–8.3)
RBC # BLD AUTO: 5.13 10E6/UL (ref 4.4–5.9)
SODIUM SERPL-SCNC: 139 MMOL/L (ref 135–145)
WBC # BLD AUTO: 9.6 10E3/UL (ref 4–11)

## 2024-07-19 PROCEDURE — 250N000011 HC RX IP 250 OP 636: Performed by: INTERNAL MEDICINE

## 2024-07-19 PROCEDURE — 74178 CT ABD&PLV WO CNTR FLWD CNTR: CPT

## 2024-07-19 PROCEDURE — 80074 ACUTE HEPATITIS PANEL: CPT | Performed by: INTERNAL MEDICINE

## 2024-07-19 PROCEDURE — 83735 ASSAY OF MAGNESIUM: CPT | Performed by: STUDENT IN AN ORGANIZED HEALTH CARE EDUCATION/TRAINING PROGRAM

## 2024-07-19 PROCEDURE — 84450 TRANSFERASE (AST) (SGOT): CPT | Performed by: INTERNAL MEDICINE

## 2024-07-19 PROCEDURE — 36415 COLL VENOUS BLD VENIPUNCTURE: CPT | Performed by: INTERNAL MEDICINE

## 2024-07-19 PROCEDURE — 250N000012 HC RX MED GY IP 250 OP 636 PS 637: Performed by: INTERNAL MEDICINE

## 2024-07-19 PROCEDURE — 250N000013 HC RX MED GY IP 250 OP 250 PS 637: Performed by: INTERNAL MEDICINE

## 2024-07-19 PROCEDURE — 85048 AUTOMATED LEUKOCYTE COUNT: CPT | Performed by: INTERNAL MEDICINE

## 2024-07-19 PROCEDURE — 82040 ASSAY OF SERUM ALBUMIN: CPT | Performed by: INTERNAL MEDICINE

## 2024-07-19 PROCEDURE — 99221 1ST HOSP IP/OBS SF/LOW 40: CPT | Performed by: SURGERY

## 2024-07-19 PROCEDURE — 120N000001 HC R&B MED SURG/OB

## 2024-07-19 PROCEDURE — 99233 SBSQ HOSP IP/OBS HIGH 50: CPT | Performed by: INTERNAL MEDICINE

## 2024-07-19 RX ORDER — PANTOPRAZOLE SODIUM 40 MG/1
40 TABLET, DELAYED RELEASE ORAL
Status: DISCONTINUED | OUTPATIENT
Start: 2024-07-19 | End: 2024-07-21 | Stop reason: HOSPADM

## 2024-07-19 RX ORDER — NICOTINE POLACRILEX 4 MG
15-30 LOZENGE BUCCAL
Status: DISCONTINUED | OUTPATIENT
Start: 2024-07-19 | End: 2024-07-21 | Stop reason: HOSPADM

## 2024-07-19 RX ORDER — MAGNESIUM HYDROXIDE/ALUMINUM HYDROXICE/SIMETHICONE 120; 1200; 1200 MG/30ML; MG/30ML; MG/30ML
15 SUSPENSION ORAL ONCE
Status: COMPLETED | OUTPATIENT
Start: 2024-07-19 | End: 2024-07-19

## 2024-07-19 RX ORDER — DIPHENHYDRAMINE HCL 50 MG
50 CAPSULE ORAL ONCE
Status: COMPLETED | OUTPATIENT
Start: 2024-07-19 | End: 2024-07-19

## 2024-07-19 RX ORDER — IOPAMIDOL 755 MG/ML
90 INJECTION, SOLUTION INTRAVASCULAR ONCE
Status: COMPLETED | OUTPATIENT
Start: 2024-07-19 | End: 2024-07-19

## 2024-07-19 RX ORDER — LIDOCAINE 40 MG/G
CREAM TOPICAL
OUTPATIENT
Start: 2024-07-19

## 2024-07-19 RX ORDER — DEXTROSE MONOHYDRATE 25 G/50ML
25-50 INJECTION, SOLUTION INTRAVENOUS
Status: DISCONTINUED | OUTPATIENT
Start: 2024-07-19 | End: 2024-07-21 | Stop reason: HOSPADM

## 2024-07-19 RX ORDER — MAGNESIUM SULFATE HEPTAHYDRATE 40 MG/ML
2 INJECTION, SOLUTION INTRAVENOUS ONCE
Status: COMPLETED | OUTPATIENT
Start: 2024-07-19 | End: 2024-07-19

## 2024-07-19 RX ORDER — DIPHENHYDRAMINE HYDROCHLORIDE 50 MG/ML
12.5 INJECTION INTRAMUSCULAR; INTRAVENOUS ONCE
Status: DISCONTINUED | OUTPATIENT
Start: 2024-07-19 | End: 2024-07-19

## 2024-07-19 RX ADMIN — FAMOTIDINE 20 MG: 10 INJECTION, SOLUTION INTRAVENOUS at 21:09

## 2024-07-19 RX ADMIN — DIPHENHYDRAMINE HYDROCHLORIDE 50 MG: 50 CAPSULE ORAL at 16:34

## 2024-07-19 RX ADMIN — CALCIUM CARBONATE (ANTACID) CHEW TAB 500 MG 1000 MG: 500 CHEW TAB at 03:41

## 2024-07-19 RX ADMIN — IOPAMIDOL 90 ML: 755 INJECTION, SOLUTION INTRAVENOUS at 17:51

## 2024-07-19 RX ADMIN — ALUMINUM HYDROXIDE, MAGNESIUM HYDROXIDE, AND SIMETHICONE 15 ML: 1200; 120; 1200 SUSPENSION ORAL at 04:13

## 2024-07-19 RX ADMIN — CALCIUM CARBONATE (ANTACID) CHEW TAB 500 MG 1000 MG: 500 CHEW TAB at 10:20

## 2024-07-19 RX ADMIN — MAGNESIUM SULFATE HEPTAHYDRATE 2 G: 40 INJECTION, SOLUTION INTRAVENOUS at 08:40

## 2024-07-19 RX ADMIN — HYDRALAZINE HYDROCHLORIDE 10 MG: 20 INJECTION INTRAMUSCULAR; INTRAVENOUS at 23:22

## 2024-07-19 RX ADMIN — INSULIN ASPART 1 UNITS: 100 INJECTION, SOLUTION INTRAVENOUS; SUBCUTANEOUS at 12:27

## 2024-07-19 RX ADMIN — INSULIN ASPART 1 UNITS: 100 INJECTION, SOLUTION INTRAVENOUS; SUBCUTANEOUS at 16:41

## 2024-07-19 RX ADMIN — SODIUM CHLORIDE, SODIUM LACTATE, POTASSIUM CHLORIDE, CALCIUM CHLORIDE AND DEXTROSE MONOHYDRATE: 5; 600; 310; 30; 20 INJECTION, SOLUTION INTRAVENOUS at 08:15

## 2024-07-19 RX ADMIN — FAMOTIDINE 20 MG: 10 INJECTION, SOLUTION INTRAVENOUS at 10:15

## 2024-07-19 RX ADMIN — PREDNISONE 50 MG: 20 TABLET ORAL at 13:19

## 2024-07-19 RX ADMIN — PANTOPRAZOLE SODIUM 40 MG: 40 TABLET, DELAYED RELEASE ORAL at 11:51

## 2024-07-19 ASSESSMENT — ACTIVITIES OF DAILY LIVING (ADL)
ADLS_ACUITY_SCORE: 24
ADLS_ACUITY_SCORE: 39
ADLS_ACUITY_SCORE: 24
ADLS_ACUITY_SCORE: 20
ADLS_ACUITY_SCORE: 20
ADLS_ACUITY_SCORE: 25
ADLS_ACUITY_SCORE: 20
ADLS_ACUITY_SCORE: 39
ADLS_ACUITY_SCORE: 39
ADLS_ACUITY_SCORE: 25
ADLS_ACUITY_SCORE: 24
ADLS_ACUITY_SCORE: 24
ADLS_ACUITY_SCORE: 20
ADLS_ACUITY_SCORE: 39
ADLS_ACUITY_SCORE: 25
ADLS_ACUITY_SCORE: 39
ADLS_ACUITY_SCORE: 24
ADLS_ACUITY_SCORE: 24
ADLS_ACUITY_SCORE: 20
ADLS_ACUITY_SCORE: 39
ADLS_ACUITY_SCORE: 25
ADLS_ACUITY_SCORE: 25
ADLS_ACUITY_SCORE: 20

## 2024-07-19 NOTE — PLAN OF CARE
Problem: Adult Inpatient Plan of Care  Goal: Plan of Care Review  Description: The Plan of Care Review/Shift note should be completed every shift.  The Outcome Evaluation is a brief statement about your assessment that the patient is improving, declining, or no change.  This information will be displayed automatically on your shift  note.  Outcome: Progressing     Problem: Intestinal Obstruction  Goal: Optimal Bowel Function  Outcome: Progressing  Intervention: Promote Bowel Function  Recent Flowsheet Documentation  Taken 7/19/2024 0057 by Tamera Tolbert RN  Body Position: position changed independently  Taken 7/18/2024 2337 by Tamera Tolbert, RN  Body Position: position changed independently  Goal: Fluid and Electrolyte Balance  Outcome: Progressing  Goal: Absence of Infection Signs and Symptoms  Outcome: Progressing  Goal: Optimal Pain Control and Function  Outcome: Progressing     Problem: Comorbidity Management  Goal: Blood Pressure in Desired Range  Outcome: Progressing   Goal Outcome Evaluation:    Patient alert and oriented x 4. Denied pain. IV Ativan given as ordered. Blood pressure at 0046 hours 183/88. MD Notified. Recheck blood pressure  at 0129 hours 141/76, denied pain.    Tele shows BBB.    Patient complained of heart burn. MD Notified. PRN TUMS given but was not helpful. MD Notified. To keep strictly NPO for now. Maalox given as ordered. Informed patient to NPO.    Patient stated maalox was helpful.

## 2024-07-19 NOTE — H&P
[facility]    History and Physical - Hospitalist Service       Date of Admission:  7/18/2024    Assessment & Plan      Timur Bullard is a 78 year old male with a medical history significant for prior history of kidney surgery, with prior small bowel obstruction, kidney stones, type II DM, and other medical comorbidities who has been admitted with acute small bowel obstruction.    Patient Active Problem List   Diagnosis    USMAN (acute kidney injury) (H24)    Left ureteral stone    Essential hypertension    Alcohol use    Ureteral calculus, left    Type 2 diabetes mellitus with hyperglycemia, without long-term current use of insulin (H)    SBO (small bowel obstruction) (H)        .Acute small bowel obstruction-prior history of SBO which was conservatively. Pain vontrol  Last BM was yesterday and blast parsed gas yesterday General surgery ocnuslted. Consider NGT if patient dtikk.as                                                                                               3. Hypertension resume out patient medss. Hold amlodipine    4. Type II DM- accu checks, sliding scale insulin. 100-140. Hild  otheer meds    5. Left kidney stone- out aptient f/u    6. BPH- fu    7. Hepatic cirrhosis- avoid oo    8. B/l renal lesions- needs f/u . Am team to follow  9.   10. Rest of patient's medical problems, managment remains unchangedHepatic cirrhosis- avoid oo    B/l renal lesions- needs f/u . Am team to follow    Rest of patient's medical problems, managment remains unchanged       Diet: N.p.o. with ice chips  DVT Prophylaxis: Pneumatic Compression Devices  Polo Catheter: Not present  Lines: None     Cardiac Monitoring: None  Code Status:  Full code    Clinically Significant Risk Factors Present on Admission                # Drug Induced Platelet Defect: home medication list includes an antiplatelet medication   # Hypertension: Noted on problem list                        Disposition Plan      Medically Ready for Discharge: Anticipated in 2-4 Days           Yareli Kelly MD  Hospitalist Service  [facility]  Securely message with [employer] (more info)  Text page via [employer] Paging/Directory     ______________________________________________________________________    Chief Complaint   Abdominal pain        History of Present Illness   Timur Bullard is a 78 year old male with a medical history significant for prior history of kidney surgery, with prior small bowel obstruction, kidney stones, type II DM, and other medical comorbidities who has been admitted with acute small bowel obstruction.    Patient was seen in in his room on admission.  He was awake alert, oriented to place person and time. Pt transfer from urgency care for abdominal bloating and vomiting. CT done there, showed significant bowel obstruction. Abdomen distended and firm. Alert and oriented x4.     Patient reportedly threw up about 20-25 times at home.  Has no history of fevers, chills, chest pain, cough,     Preliminary blood pressures were elevated at 183/98 with a heart rate of 101.  Initial course was also complicated by increased nausea and vomiting.  This improved with some Compazine and Ativan.  Patient is being admitted for further inpatient cares.  General surgery has been consulted.        CT abd pain:    EXAM: CT ABDOMEN PELVIS WO   LOCATION: [facility] [place]   DATE: 7/18/2024     INDICATION: Vomiting and nausea.   COMPARISON: 11/24/2021.   TECHNIQUE: CT scan of the abdomen and pelvis was performed without IV contrast. Multiplanar reformats were obtained. Dose reduction techniques were used.   CONTRAST: None.     FINDINGS:      Absence of intravenous contrast limits the sensitivity of this examination for detection of infectious/inflammatory change, post traumatic abnormalities, vascular abnormalities, and visceral lesions.     LOWER CHEST: Calcified granulomatous disease. Scattered  areas of linear scarring and atelectasis. Cardiomegaly. Atherosclerosis of coronary arteries and visualized thoracic aorta. Small hiatal hernia.     HEPATOBILIARY: Liver surface nodularity, compatible with cirrhosis.     Gallbladder is normal.     No intrahepatic or intrahepatic biliary ductal dilatation.     PANCREAS: Normal attenuation. No peripancreatic inflammatory fat stranding.     SPLEEN: Normal attenuation. Normal size.     ADRENAL GLANDS: Normal.     KIDNEYS: No hydronephrosis. Unchanged calcified scarring at the superior to mid zone of the right kidney. Multiple bilateral renal cysts, which have a mixed simple and hemorrhagic/proteinaceous. Other renal lesions are indeterminate in attenuation, such as an exophytic 2.7 cm lesion of the mid zone left kidney, series 2 image 104; solid renal neoplasm cannot be excluded. Renal protocol CT/MR imaging follow-up is recommended.     Several tiny nonobstructing left renal stones.     Urinary bladder is mildly thick-walled, likely due to incomplete distention and chronic outlet obstruction.     PELVIC ORGANS: Moderately enlarged prostate gland, which contains fiducial markers.     BOWEL: Multiple loops of mildly dilated and fluid-filled small bowel, as well as moderate distention of stomach and distal esophagus with fluid, which appears to be secondary to a gradual zone of transition occurring in the right hemiabdomen, near series 2 image 123, suggestive of a mechanical partial small bowel obstruction. A high-grade zone of transition is not identified. There is a small amount of mesenteric fluid and edema, which is likely reactive. Extensive colonic diverticulosis, without evidence of acute diverticulitis. Appendix is normal and is contained within a small to moderate-sized right inguinal hernia.     Trace intraperitoneal free fluid, likely reactive. No intraperitoneal free air. Small fat-containing umbilical hernia. Small to moderate-sized left inguinal hernia,  which contains some epiploic fat, as well as a single colonic diverticulum.     LYMPH NODES: Subcentimeter mesenteric lymph nodes and nonspecific haziness of the central mesentery, slightly increased from previous examination and likely reactive. A CT follow-up is recommended in 6 months to document stability or resolution of this finding.     VASCULATURE: No abdominal aortic aneurysm. Moderate atherosclerotic calcifications.     MUSCULOSKELETAL: No suspicious abnormality.     OTHER: No additionally significant abnormalities.     IMPRESSION:   1. Mechanical partial small bowel obstruction, with a gradual zone of transition occurring in the right hemiabdomen. Aspiration precautions recommended.   2.  Nonspecific haziness of the central mesentery, likely reactive. A CT follow-up is recommended in 6 months to document stability or resolution of this finding.   3.  Hepatic cirrhosis.   4.  Multiple bilateral renal lesions, some of which are indeterminate in attenuation. Renal protocol CT/MR imaging follow-up is recommended.   5.  Colonic diverticulosis.   6.  Moderate prostatomegaly.     ATTENTION: ABNORMAL REPORT     THE DICTATION ABOVE DESCRIBES AN ABNORMALITY FOR WHICH FOLLOWUP IS NEEDED.       Past Medical History    Past Medical History:   Diagnosis Date    Hypertension     Type 2 diabetes mellitus with hyperglycemia, without long-term current use of insulin (H) 1/30/2020   Medical History    Medical History  Medical History Date Comments   Hydrocele 2008 right hydrocele   Goiter 1990s radiation Tx   Pyriformis syndrome   left side recurs   Psoriasis       Dyslipidemia (high LDL; low HDL) 02/01/2014     Hypertriglyceridemia 02/01/2014     Hypercholesteremia 02/01/2014     Kidney disease       Renal mass, right       History of nephrolithiasis 04/07/2016     Type 2 diabetes mellitus without complication (HC) 04/08/2016 Diagnosed 4/2016. GLUCOSE (mg/dL) Date Value 04/08/2016 131* HEMOGLOBIN A1C SCREENING (% Total  Hgb) Date Value 04/07/2016 6.6*   Abnormal abdominal x-ray 04/08/2016 Abnormal thick walled appearance to the urinary bladder on abdominal x-rays done 4/7/2016, and Radiology advised Urology follow up for further evaluation.   Anxiety       Diverticulitis of colon 10/24/2020     Small bowel obstruction (HC) 04/06/2016 Resolved after conservative management 7/2019   HTN (hypertension)       Ureterolithiasis       Malignant neoplasm of right kidney (HC) 2015     Kidney stones       Asymmetrical sensorineural hearing loss       Bilateral tinnitus       Chronic allergic rhinitis       Pes planus       Age-related macular degeneration, wet, right eye (HC) 01/24/2024         Past Surgical History   Past Surgical History:   Procedure Laterality Date    CYSTOSCOPY  12/30/2015    CYSTOSCOPY  11/15/2021    cystolitholapaxy, right stent    CYSTOSCOPY W/ LITHOLAPAXY / EHL N/A 11/15/2021    Procedure: CYSTOSCOPY, REMOVAL OF BLADDER STONES;  Surgeon: Adithya Quinones MD;  Location: [facility] [place] OR    CYSTOURETEROSCOPY, WITH LITHOTRIPSY USING AARTI 120P LASER AND URETERAL STENT INSERTION Right 11/15/2021    Procedure: RIGHT URETEROSCOPY, WITH HOLMIUM LASER LITHOTRIPSY, STONE EXTRACTION AND DOUBLE J  URETERAL STENT PLACEMENT RIGHT;  Surgeon: Adithya Quinones MD;  Location: [facility] [place] OR    KIDNEY SURGERY Right    Surgical History    Surgical History  Surgery Date Site/Laterality Comments   (IA) OR DRAINAGE OF HYDROCELE TUNICA 11/1/2013 - 11/30/2013       thyroid radiated 1/1/1990 - 12/31/1990   goiter    TURBINOPLASTY 10/1/2013 - 10/31/2013   left nostril    CYSTOSCOPY; RIGHT URETEROSCOPY HOLMIUM LASER STONE ABLATION OF RIGHT PROXIMAL URETERAL STONE AND TWO NON-OBSTRUCTING RENAL STONES, RIGHT URETERAL STENT PLACEMENT 04/22/2015       ROBOTIC ASSISTED RIGHT NEPHRECTOMY PARTIAL WITH INTRAOP ULTRASOUND 05/11/2015 Right     CYSTOSCOPY URETEROSCOPY left ureteral stent placement 12/24/2015 Left     CYSTOSCOPY W/  LITHOLAPAXY / EHL 07/28/2017       CYSTOSCOPY, PLACEMENT BILATERAL URETERAL STENTS, BILATERAL RETROGRADES (Bilateral) 11/24/2021 Bilateral     CYSTOSCOPY, RIGHT URETEROSCOPY WITH STONE EXTRACTION, BILATERAL PYELOGRAM, BILATERAL URETERAL STENT EXCHANGE, LEFT URETEROSCOPY 12/03/2021       CORONARY STENT PLACEMENT   Left        Prior to Admission Medications   Prior to Admission Medications   Prescriptions Last Dose Informant Patient Reported? Taking?   HYDROcodone-acetaminophen (NORCO) 5-325 MG tablet   No No   Sig: Take 1 tablet by mouth every 4 hours as needed for moderate to severe pain   LORazepam (ATIVAN) 1 MG tablet   Yes No   Sig: [LORAZEPAM (ATIVAN) 1 MG TABLET] Take 1 mg by mouth at bedtime as needed for anxiety.   MEDICATION CANNOT BE REORDERED - PLEASE MANUALLY REORDER AND DISCONTINUE THE OLD ORDER   Yes No   Sig: [DOCOSHEXANOIC ACID-EICOSAPENT 500 MG (FISH OIL) 500-100 MG CAP CAPSULE] Take 1,500 mg by mouth daily.   albuterol (PROAIR HFA;PROVENTIL HFA;VENTOLIN HFA) 90 mcg/actuation inhaler   Yes No   Sig: [ALBUTEROL (PROAIR HFA;PROVENTIL HFA;VENTOLIN HFA) 90 MCG/ACTUATION INHALER] Inhale 1-2 puffs every 4 (four) hours as needed for wheezing.   albuterol (PROVENTIL) 2.5 mg /3 mL (0.083 %) nebulizer solution   Yes No   Sig: [ALBUTEROL (PROVENTIL) 2.5 MG /3 ML (0.083 %) NEBULIZER SOLUTION] Take 2.5 mg by nebulization every 4 (four) hours as needed for wheezing.   amLODIPine (NORVASC) 5 MG tablet   Yes No   Sig: [AMLODIPINE (NORVASC) 5 MG TABLET] Take 5 mg by mouth at bedtime.   arginine 500 mg tablet   Yes No   Sig: [ARGININE 500 MG TABLET] Take 500 mg by mouth daily.   aspirin (ASA) 81 MG EC tablet   Yes No   Sig: Take 1 tablet (81 mg) by mouth At Bedtime   b complex vitamins tablet   Yes No   Sig: [B COMPLEX VITAMINS TABLET] Take 1 tablet by mouth daily.   budesonide (RHINOCORT ALLERGY) 32 mcg/actuation nasal spray   Yes No   Sig: [BUDESONIDE (RHINOCORT ALLERGY) 32 MCG/ACTUATION NASAL SPRAY] 1 spray into  each nostril daily as needed for rhinitis.   cholecalciferol, vitamin D3, 1,000 unit tablet   Yes No   Sig: [CHOLECALCIFEROL, VITAMIN D3, 1,000 UNIT TABLET] Take 1,000 Units by mouth 2 (two) times a day.   chromium picolinate 1,000 mcg Tab   Yes No   Sig: [CHROMIUM PICOLINATE 1,000 MCG TAB] Take 1,000 mcg by mouth daily.   clobetasoL (TEMOVATE) 0.05 % ointment   Yes No   Sig: [CLOBETASOL (TEMOVATE) 0.05 % OINTMENT] Apply topically 2 (two) times a day as needed (psoriasis).   coQ10, ubiquinol, 100 mg cap   Yes No   Sig: [COQ10, UBIQUINOL, 100 MG CAP] Take 100 mg by mouth daily.   lidocaine (XYLOCAINE) 2 % external gel   No No   Sig: Place into the urethra 2 times daily   magnesium 200 mg Tab   Yes No   Sig: [MAGNESIUM 200 MG TAB] Take 200 mg by mouth daily.   metFORMIN (GLUCOPHAGE) 500 MG tablet   Yes No   Sig: Take 500 mg by mouth daily (with breakfast)   metoprolol succinate ER (TOPROL-XL) 25 MG 24 hr tablet   Yes No   Sig: Take 25 mg by mouth daily   phenazopyridine (PYRIDIUM) 200 MG tablet   No No   Sig: Take 1 tablet (200 mg) by mouth 3 times daily as needed for irritation   selenium 200 mcg TbEC   Yes No   Sig: [SELENIUM 200 MCG TBEC] Take 200 mcg by mouth daily.   vitamin E 400 unit capsule   Yes No   Sig: [VITAMIN E 400 UNIT CAPSULE] Take 400 Units by mouth daily.      Facility-Administered Medications: None        Review of Systems    The 10 point Review of Systems is negative other than noted in the HPI or here.     Social History   I have reviewed this patient's social history and updated it with pertinent information if needed.  Social History     Tobacco Use    Smoking status: Former     Current packs/day: 0.00     Types: Cigarettes     Quit date: 1978     Years since quittin.5    Smokeless tobacco: Never   Vaping Use    Vaping status: Never Used   Substance Use Topics    Alcohol use: Not Currently     Comment: Alcoholic Drinks/day: Occasionally    Drug use: No         Family History   I have  "reviewed this patient's family history and updated it with pertinent information if needed.  Family History   Problem Relation Age of Onset    Heart Disease Father          Allergies   Allergies   Allergen Reactions    Ciprofloxacin Other (See Comments)     \"I get loopy\"    Gadolinium-Containing Contrast Media [Gadolinium Derivatives] Hives    Percocet [Oxycodone-Acetaminophen] Other (See Comments)     Delusional         Physical Exam   Vital Signs: Temp: 97.9  F (36.6  C) Temp src: Oral BP: (!) 183/98 (Nurse notified) Pulse: 96   Resp: 22 SpO2: 94 % O2 Device: None (Room air)    Weight: 0 lbs 0 oz      General Aox3, appropriate affect, NAD, on RA  HEENT  MMM, EOMI, PERRL  Chest Adeq E b/l, No wheezing  Heart RRR, No M/R/G  Abd- Soft, NT, BS+  - Deferred,   Extremity- Moving all extremities, No digital clubbing,   No edema  Neuro- Aox3, moving all extremities,  gait not checked  Skin  Has no tattoo, No skin rash     Medical Decision Making       90 MINUTES SPENT BY ME on the date of service doing chart review, history, exam, documentation & further activities per the note.      ------------------ MEDICAL DECISION MAKING ------------------------------------------------------------------------------------------------------  MANAGEMENT DISCUSSED with the following over the past 24 hours:         Data   ------------------------- PAST 24 HR DATA REVIEWED -----------------------------------------------        Imaging results reviewed over the past 24 hrs:   Recent Results (from the past 24 hour(s))   CT Abdomen Pelvis w/o Contrast    Narrative    For Patients: As a result of the 21st Century Cures Act, medical imaging exams and procedure reports are released immediately into your electronic medical record. You may view this report before your referring provider. If you have questions, please contact your health care provider.    EXAM: CT ABDOMEN PELVIS WO  LOCATION: [facility] [place]  DATE: " 7/18/2024    INDICATION: Vomiting and nausea.  COMPARISON: 11/24/2021.  TECHNIQUE: CT scan of the abdomen and pelvis was performed without IV contrast. Multiplanar reformats were obtained. Dose reduction techniques were used.  CONTRAST: None.    FINDINGS:      Absence of intravenous contrast limits the sensitivity of this examination for detection of infectious/inflammatory change, post traumatic abnormalities, vascular abnormalities, and visceral lesions.    LOWER CHEST: Calcified granulomatous disease. Scattered areas of linear scarring and atelectasis. Cardiomegaly. Atherosclerosis of coronary arteries and visualized thoracic aorta. Small hiatal hernia.    HEPATOBILIARY: Liver surface nodularity, compatible with cirrhosis.    Gallbladder is normal.    No intrahepatic or intrahepatic biliary ductal dilatation.    PANCREAS: Normal attenuation. No peripancreatic inflammatory fat stranding.    SPLEEN: Normal attenuation. Normal size.    ADRENAL GLANDS: Normal.    KIDNEYS: No hydronephrosis. Unchanged calcified scarring at the superior to mid zone of the right kidney. Multiple bilateral renal cysts, which have a mixed simple and hemorrhagic/proteinaceous. Other renal lesions are indeterminate in attenuation, such as an exophytic 2.7 cm lesion of the mid zone left kidney, series 2 image 104; solid renal neoplasm cannot be excluded. Renal protocol CT/MR imaging follow-up is recommended.    Several tiny nonobstructing left renal stones.    Urinary bladder is mildly thick-walled, likely due to incomplete distention and chronic outlet obstruction.    PELVIC ORGANS: Moderately enlarged prostate gland, which contains fiducial markers.    BOWEL: Multiple loops of mildly dilated and fluid-filled small bowel, as well as moderate distention of stomach and distal esophagus with fluid, which appears to be secondary to a gradual zone of transition occurring in the right hemiabdomen, near series 2 image 123, suggestive of a  mechanical partial small bowel obstruction. A high-grade zone of transition is not identified. There is a small amount of mesenteric fluid and edema, which is likely reactive. Extensive colonic diverticulosis, without evidence of acute diverticulitis. Appendix is normal and is contained within a small to moderate-sized right inguinal hernia.    Trace intraperitoneal free fluid, likely reactive. No intraperitoneal free air. Small fat-containing umbilical hernia. Small to moderate-sized left inguinal hernia, which contains some epiploic fat, as well as a single colonic diverticulum.    LYMPH NODES: Subcentimeter mesenteric lymph nodes and nonspecific haziness of the central mesentery, slightly increased from previous examination and likely reactive. A CT follow-up is recommended in 6 months to document stability or resolution of this finding.    VASCULATURE: No abdominal aortic aneurysm. Moderate atherosclerotic calcifications.    MUSCULOSKELETAL: No suspicious abnormality.    OTHER: No additionally significant abnormalities.    Impression    1.  Mechanical partial small bowel obstruction, with a gradual zone of transition occurring in the right hemiabdomen. Aspiration precautions recommended.  2.  Nonspecific haziness of the central mesentery, likely reactive. A CT follow-up is recommended in 6 months to document stability or resolution of this finding.  3.  Hepatic cirrhosis.  4.  Multiple bilateral renal lesions, some of which are indeterminate in attenuation. Renal protocol CT/MR imaging follow-up is recommended.  5.  Colonic diverticulosis.  6.  Moderate prostatomegaly.    ATTENTION: ABNORMAL REPORT    THE DICTATION ABOVE DESCRIBES AN ABNORMALITY FOR WHICH FOLLOWUP IS NEEDED.   CT External Imaging Abdomen    Narrative    Images were obtained from an external facility.  Click PACS Images   hyperlink to view images.  Textual results have been scanned into the   media tab.

## 2024-07-19 NOTE — PLAN OF CARE
Problem: Comorbidity Management  Goal: Blood Pressure in Desired Range  Outcome: Progressing     Problem: Intestinal Obstruction  Goal: Optimal Bowel Function  Outcome: Progressing  Intervention: Promote Bowel Function  Recent Flowsheet Documentation  Taken 7/19/2024 0837 by Smita Vieira RN  Body Position: position changed independently  Goal: Fluid and Electrolyte Balance  Outcome: Progressing  Goal: Absence of Infection Signs and Symptoms  Outcome: Progressing  Goal: Optimize Nutrition Status  Outcome: Progressing  Goal: Optimal Pain Control and Function  Outcome: Progressing   Goal Outcome Evaluation:                    A&Ox4. Up independently in room. Continuous IV fluids running at 100ml/hr. NPO. Denies pain. Pt reports heartburn. PRN Tums given with minimal relief, MD notified. Plan is for pt to have Renal CT around 17:30, pre-medication for contrast allergy started at 13:30, per orders. Pt reported he had 1 large loose BM.

## 2024-07-19 NOTE — PLAN OF CARE
Goal Outcome Evaluation:      Problem: Intestinal Obstruction  Goal: Optimal Bowel Function  Outcome: Progressing  Intervention: Promote Bowel Function  Recent Flowsheet Documentation  Taken 7/18/2024 [id number] by Juan Pacheco, RN  Chair: Upright in chair  Goal: Fluid and Electrolyte Balance  Outcome: Progressing  Goal: Absence of Infection Signs and Symptoms  Outcome: Progressing  Goal: Optimize Nutrition Status  Outcome: Progressing  Goal: Optimal Pain Control and Function  Outcome: Progressing     Problem: Comorbidity Management  Goal: Blood Pressure in Desired Range  Outcome: Progressing     Pt transfer from urgency care for abdominal bloating and vomiting. CT done there, showed significant bowel obstruction. Abdomen distended and firm. Alert and oriented x4. Independent. BP elevated, 183/98, . MD paged. IV hydralazine and compazine given, which were not effective. D5 in LR 100mL/hr started. Pt had 1000cc of brown/black emesis. New gown and linens changed. Pt requesting Ativan. MD paged. Call light within reach.

## 2024-07-19 NOTE — CONSULTS
"General Surgery Consult    Timur Bullard MRN# [id number]     Date of Admission: 7/18/2024    Reason for Consult  Small bowel obstruction    History of Present Illness  Patient is a 78 year old man with history of diabetes and prior kidney surgery presenting from OSH with small bowel obstruction. He began feeling nauseated 2 days ago. Yesterday his symptoms worsened and he had a very large volume emesis. He had a BM yesterday afternoon. He thinks he is passing gas. CT scan revealed a bowel obstruction and he was transferred here for management. He feels ok this morning. No pain. Only complaint is heartburn. No nasogastric tube yet. Has had SBO in the past.     Past Medical History:  Past Medical History:   Diagnosis Date    Hypertension     Type 2 diabetes mellitus with hyperglycemia, without long-term current use of insulin (H) 1/30/2020       Past Surgical History:  Past Surgical History:   Procedure Laterality Date    CYSTOSCOPY  12/30/2015    CYSTOSCOPY  11/15/2021    cystolitholapaxy, right stent    CYSTOSCOPY W/ LITHOLAPAXY / EHL N/A 11/15/2021    Procedure: CYSTOSCOPY, REMOVAL OF BLADDER STONES;  Surgeon: Adithya Quinones MD;  Location: [facility]    CYSTOURETEROSCOPY, WITH LITHOTRIPSY USING AARTI 120P LASER AND URETERAL STENT INSERTION Right 11/15/2021    Procedure: RIGHT URETEROSCOPY, WITH HOLMIUM LASER LITHOTRIPSY, STONE EXTRACTION AND DOUBLE J  URETERAL STENT PLACEMENT RIGHT;  Surgeon: Adithya Quinones MD;  Location: [facility]    KIDNEY SURGERY Right        Allergies:     Allergies   Allergen Reactions    Ciprofloxacin Other (See Comments)     \"I get loopy\"    Gadolinium-Containing Contrast Media [Gadolinium Derivatives] Hives    Percocet [Oxycodone-Acetaminophen] Other (See Comments)     Delusional      Medications:  Current Facility-Administered Medications   Medication Dose Route Frequency Provider Last Rate Last Admin    albuterol (PROVENTIL HFA/VENTOLIN HFA) inhaler  1-2 " puff Inhalation Q4H PRN Yareli Kelly MD        calcium carbonate (TUMS) chewable tablet 1,000 mg  1,000 mg Oral 4x Daily PRN Yareli Kelly MD   1,000 mg at 07/19/24 0341    dextrose 5% in lactated ringers infusion   Intravenous Continuous Yareli Kelly  mL/hr at 07/18/24 2220 New Bag at 07/18/24 2220    famotidine (PEPCID) injection 20 mg  20 mg Intravenous Q12H Yareli Kelly MD   20 mg at 07/18/24 2214    hydrALAZINE (APRESOLINE) injection 10 mg  10 mg Intravenous Q4H PRN Yareli Kelly MD        HYDROmorphone (DILAUDID) injection 0.2 mg  0.2 mg Intravenous Q2H PRN Yareli Kelly MD        HYDROmorphone (DILAUDID) injection 0.4 mg  0.4 mg Intravenous Q2H PRN Yareli Kelly MD        lidocaine (LMX4) cream   Topical Q1H PRN Yareli Kelly MD        lidocaine 1 % 0.1-1 mL  0.1-1 mL Other Q1H PRN Yareli Kelly MD        naloxone (NARCAN) injection 0.2 mg  0.2 mg Intravenous Q2 Min PRN Yareli Kelly MD        Or    naloxone (NARCAN) injection 0.4 mg  0.4 mg Intravenous Q2 Min PRN Yareli Kelly MD        Or    naloxone (NARCAN) injection 0.2 mg  0.2 mg Intramuscular Q2 Min PRN Yareli Kelly MD        Or    naloxone (NARCAN) injection 0.4 mg  0.4 mg Intramuscular Q2 Min PRN Yareli Kelly MD        nitroGLYcerin (NITROSTAT) sublingual tablet 0.4 mg  0.4 mg Sublingual Q5 Min PRN Yareli Kelly MD        ondansetron (ZOFRAN ODT) ODT tab 4 mg  4 mg Oral Q6H PRN Yareli Kelly MD        Or    ondansetron (ZOFRAN) injection 4 mg  4 mg Intravenous Q6H PRN Yareli Kelly MD        ondansetron (ZOFRAN) injection 4 mg  4 mg Intravenous Q6H PRN Gondal, Jean Marie J, MD        prochlorperazine (COMPAZINE) injection 5 mg  5 mg Intravenous Q6H PRN Gondal, Saad J, MD   5 mg at 07/18/24 2115    Or    prochlorperazine (COMPAZINE) tablet 5 mg  5 mg Oral Q6H PRN Gondal, Saad J, MD        Or    prochlorperazine (COMPAZINE) suppository 12.5 mg  12.5 mg Rectal Q12H PRN Gondal, Saad J, MD        senna-docusate  (SENOKOT-S/PERICOLACE) 8.6-50 MG per tablet 1 tablet  1 tablet Oral BID PRN Yareli Kelly MD        Or    senna-docusate (SENOKOT-S/PERICOLACE) 8.6-50 MG per tablet 2 tablet  2 tablet Oral BID PRN Yareli Kelly MD        sodium chloride (PF) 0.9% PF flush 3 mL  3 mL Intracatheter Q8H Yareli Kelly MD   3 mL at 244    sodium chloride (PF) 0.9% PF flush 3 mL  3 mL Intracatheter q1 min prn Yareli Kelly MD         Social History:  Social History     Socioeconomic History    Marital status:      Spouse name: Not on file    Number of children: Not on file    Years of education: Not on file    Highest education level: Not on file   Occupational History    Not on file   Tobacco Use    Smoking status: Former     Current packs/day: 0.00     Types: Cigarettes     Quit date: 1978     Years since quittin.5    Smokeless tobacco: Never   Vaping Use    Vaping status: Never Used   Substance and Sexual Activity    Alcohol use: Not Currently     Comment: Alcoholic Drinks/day: Occasionally    Drug use: No    Sexual activity: Not on file   Other Topics Concern    Parent/sibling w/ CABG, MI or angioplasty before 65F 55M? Not Asked   Social History Narrative    Not on file     Social Determinants of Health     Financial Resource Strain: Low Risk  (2024)    Received from [facility]    Financial Resource Strain     Difficulty of Paying Living Expenses: 3     Difficulty of Paying Living Expenses: Not on file   Food Insecurity: No Food Insecurity (2024)    Received from [facility]    Food Insecurity     Worried About Running Out of Food in the Last Year: 1   Transportation Needs: No Transportation Needs (2024)    Received from [facility]    Transportation Needs     Lack of Transportation (Medical): 1   Physical Activity: Not on file   Stress: Not on file   Social Connections: Socially  "Integrated (1/24/2024)    Received from [facility]    Social Connections     Frequency of Communication with Friends and Family: 0   Interpersonal Safety: Not on file   Housing Stability: Low Risk  (1/24/2024)    Received from [facility]    Housing Stability     Unable to Pay for Housing in the Last Year: 1     Family History:  Family History   Problem Relation Age of Onset    Heart Disease Father        ROS:  12 point review negative except as stated in H&P    Exam:  BP (!) 161/88 (BP Location: Left arm)   Pulse 90   Temp 98.6  F (37  C) (Oral)   Resp 18   Ht 1.778 m (5' 10\")   Wt 84.4 kg (186 lb 1.1 oz)   SpO2 95%   BMI 26.70 kg/m    General: Alert, interactive, NAD  Resp: Non-labored breathing on RA  Cardiac: RRR  Abdomen: Distended but quite soft. Completely non-tender.     Labs:   Personally reviewed  WBC: 9.6  Cr: 0.91    Imaging:   Personally reviewed.   1.  Mechanical partial small bowel obstruction, with a gradual zone of transition occurring in the right hemiabdomen. Aspiration precautions recommended.   2.  Nonspecific haziness of the central mesentery, likely reactive. A CT follow-up is recommended in 6 months to document stability or resolution of this finding.   3.  Hepatic cirrhosis.   4.  Multiple bilateral renal lesions, some of which are indeterminate in attenuation. Renal protocol CT/MR imaging follow-up is recommended.   5.  Colonic diverticulosis.   6.  Moderate prostatomegaly.     Assessment: 78 year old male with history of prior abdominal surgery presenting with SBO. I explained the reasons for this condition and the standard treatment with decompression and/or bowel rest. Patient feels ok today and does not want NG at this point. I think this is reasonable as he is not very distended and is passing gas. If he starts having more nausea or emesis, NG placement should be done.     Plan:   - NPO  - IVF  - NG decompression " if emesis  - Surgery will follow    Carl Lyles MD  General Surgeon  [facility]  Surgery [facility] - [address]  [address]  [place] [address]  Office: [phone]

## 2024-07-19 NOTE — PHARMACY-ADMISSION MEDICATION HISTORY
Pharmacist Admission Medication History    Admission medication history is complete. The information provided in this note is only as accurate as the sources available at the time of the update.    Information Source(s): Patient, Prescription bottles, and CareEverywhere/SureScripts via in-person    Pertinent Information: patient does not want to take any supplements while in hospital    Changes made to PTA medication list:  Added: levocetirizine, potassium citrate, verapamil, NTG, fish oil  Deleted: albuterol neb, asa,budesonide,clobetasol, pyridium  Changed: metformin BID    Allergies reviewed with patient and updates made in EHR: no    Medication History Completed By: Darline Reis RPH 7/18/2024 10:25 PM    PTA Med List   Medication Sig Last Dose    albuterol (PROAIR HFA;PROVENTIL HFA;VENTOLIN HFA) 90 mcg/actuation inhaler [ALBUTEROL (PROAIR HFA;PROVENTIL HFA;VENTOLIN HFA) 90 MCG/ACTUATION INHALER] Inhale 1-2 puffs every 4 (four) hours as needed for wheezing. More than a month    amLODIPine (NORVASC) 5 MG tablet [AMLODIPINE (NORVASC) 5 MG TABLET] Take 5 mg by mouth at bedtime. 7/18/2024 at 4pm    arginine 500 mg tablet [ARGININE 500 MG TABLET] Take 500 mg by mouth daily. 7/18/2024    b complex vitamins tablet [B COMPLEX VITAMINS TABLET] Take 1 tablet by mouth daily. 7/18/2024    cholecalciferol, vitamin D3, 1,000 unit tablet [CHOLECALCIFEROL, VITAMIN D3, 1,000 UNIT TABLET] Take 1,000 Units by mouth 2 (two) times a day. 7/18/2024    coQ10, ubiquinol, 100 mg cap [COQ10, UBIQUINOL, 100 MG CAP] Take 100 mg by mouth daily. 7/18/2024    fish oil-omega-3 fatty acids 500 MG capsule Take 500 mg by mouth daily 7/18/2024    levocetirizine (XYZAL) 5 MG tablet Take 5 mg by mouth every evening as needed for allergies 7/16/2024    LORazepam (ATIVAN) 1 MG tablet [LORAZEPAM (ATIVAN) 1 MG TABLET] Take 1 mg by mouth at bedtime as needed for anxiety. More than a month    magnesium 200 mg Tab [MAGNESIUM 200 MG TAB] Take 200 mg by  mouth daily. 7/18/2024    metFORMIN (GLUCOPHAGE) 500 MG tablet Take 500 mg by mouth 2 times daily (with meals) 7/18/2024 at 4 pm    metoprolol succinate ER (TOPROL-XL) 25 MG 24 hr tablet Take 25 mg by mouth daily 7/18/2024 at 4 pm    nitroGLYcerin (NITROSTAT) 0.4 MG sublingual tablet Place 0.4 mg under the tongue every 5 minutes as needed for chest pain For chest pain place 1 tablet under the tongue every 5 minutes for 3 doses. If symptoms persist 5 minutes after 1st dose call 911. Unknown    potassium citrate (UROCIT-K) 10 MEQ (1080 MG) CR tablet Take 20 mEq by mouth 2 times daily 7/18/2024 at 4pm    selenium 200 mcg TbEC [SELENIUM 200 MCG TBEC] Take 200 mcg by mouth daily. 7/18/2024    verapamil ER (VERELAN) 120 MG 24 hr capsule Take 120 mg by mouth every morning 7/18/2024 at am    vitamin E 400 unit capsule [VITAMIN E 400 UNIT CAPSULE] Take 400 Units by mouth daily. 7/18/2024      n/a

## 2024-07-19 NOTE — PROGRESS NOTES
"[facility]    Medicine Progress Note - Hospitalist Service    Date of Admission:  7/18/2024    Assessment & Plan   Timur is a 79 y/o male with a Hx of R renal malignancy s/p partial nephrectomy 05/2015, CAD, and prior SBO who was advised to come to [facility] from urgent care on 7/19/2024 after he was found to have a partial SBO on CT AP.     SBO  Hx of SBOs  Urgent care CT AP with partial SBO with transition point in R hemiabdomen  Plan  - NPO for now  - Continue D5LR 100cc/hr IV  - If with new emesis, will proceed with NG tube  - Monitor    T2DM  Home regimen: metformin 500mg BID PO  Hospital regimen: sliding scale insulin q4h  Hypoglycemic protocol    Hepatic cirrhosis  Urgent care CT AP WO 7/18/2024 with findings of cirrosis  Prior CT AP with hepatic steatosis  Will need outpatient referral to GI for ongoing management (pt has care at [facility] so he will call for an appt)  - Will check hepatitis serologies    Bilateral renal lesions  Hx of malignant neoplasm R kidney s/p partial R nephrectomy 05/2015  - CT renal mass protocol pending    CAD s/p PCI mid LAD 3/23/2023 - ASA  PVC - Verapamil 120mg daily PO  HTN - hold home amlodipine 5mg at bedtime, metoprolol ER 25mg daily PO, verapamil 120mg daily  Anxiety - Lorazepam 1mg at bedtime PRN  BPH              Diet: NPO for Medical/Clinical Reasons Except for: Ice Chips    DVT Prophylaxis: Pneumatic Compression Devices  Polo Catheter: Not present  Lines: None     Cardiac Monitoring: ACTIVE order. Indication: Tachyarrhythmias, acute (48 hours)  Code Status: Full Code      Clinically Significant Risk Factors Present on Admission                  # Hypertension: Noted on problem list             # Overweight: Estimated body mass index is 26.7 kg/m  as calculated from the following:    Height as of this encounter: 1.778 m (5' 10\").    Weight as of this encounter: 84.4 kg (186 lb 1.1 oz).              Disposition Plan     Medically Ready for Discharge: " Anticipated in 2-4 Days             Yayo Pacheco MD  Hospitalist Service  [facility]  Securely message with [employer] (more info)  Text page via [employer] Paging/Directory   ______________________________________________________________________    Interval History   Admitted overnight. On evaluation this afternoon, he is resting on the bed comfortably. Still with abdominal distension and discomfort but is improved from admission. Still hasn't been able to have a bowel movement yet but has been able to pass gas numerous times. No further with N/V. No fevers, chills, HA, dizziness, confusion, dysuria.     Physical Exam   Vital Signs: Temp: 98  F (36.7  C) Temp src: Oral BP: (!) 142/69 Pulse: 79   Resp: 18 SpO2: 94 % O2 Device: None (Room air)    Weight: 186 lbs 1.09 oz    General Appearance: Elderly in appearance  Respiratory: CTAB  Cardiovascular: RRR  GI: Soft, tender diffusely, distended. No guarding/rigidity/rebound tenderness  Skin: No rashes or lesions  Other: A&Ox4 and conversing coherently    Medical Decision Making       60 MINUTES SPENT BY ME on the date of service doing chart review, history, exam, documentation & further activities per the note.      Data     I have personally reviewed the following data over the past 24 hrs:    9.6  \   14.2   / N/A     139 104 18.3 /  153 (H)   4.2 24 0.91 \     ALT: 27 AST: 19 AP: 65 TBILI: 1.8 (H)   ALB: 4.1 TOT PROTEIN: 6.4 LIPASE: N/A       Imaging results reviewed over the past 24 hrs:   Recent Results (from the past 24 hour(s))   CT Abdomen Pelvis w/o Contrast    Narrative    For Patients: As a result of the 21st Century Cures Act, medical imaging exams and procedure reports are released immediately into your electronic medical record. You may view this report before your referring provider. If you have questions, please contact your health care provider.    EXAM: CT ABDOMEN PELVIS WO  LOCATION: [facility]  DATE:  7/18/2024    INDICATION: Vomiting and nausea.  COMPARISON: 11/24/2021.  TECHNIQUE: CT scan of the abdomen and pelvis was performed without IV contrast. Multiplanar reformats were obtained. Dose reduction techniques were used.  CONTRAST: None.    FINDINGS:      Absence of intravenous contrast limits the sensitivity of this examination for detection of infectious/inflammatory change, post traumatic abnormalities, vascular abnormalities, and visceral lesions.    LOWER CHEST: Calcified granulomatous disease. Scattered areas of linear scarring and atelectasis. Cardiomegaly. Atherosclerosis of coronary arteries and visualized thoracic aorta. Small hiatal hernia.    HEPATOBILIARY: Liver surface nodularity, compatible with cirrhosis.    Gallbladder is normal.    No intrahepatic or intrahepatic biliary ductal dilatation.    PANCREAS: Normal attenuation. No peripancreatic inflammatory fat stranding.    SPLEEN: Normal attenuation. Normal size.    ADRENAL GLANDS: Normal.    KIDNEYS: No hydronephrosis. Unchanged calcified scarring at the superior to mid zone of the right kidney. Multiple bilateral renal cysts, which have a mixed simple and hemorrhagic/proteinaceous. Other renal lesions are indeterminate in attenuation, such as an exophytic 2.7 cm lesion of the mid zone left kidney, series 2 image 104; solid renal neoplasm cannot be excluded. Renal protocol CT/MR imaging follow-up is recommended.    Several tiny nonobstructing left renal stones.    Urinary bladder is mildly thick-walled, likely due to incomplete distention and chronic outlet obstruction.    PELVIC ORGANS: Moderately enlarged prostate gland, which contains fiducial markers.    BOWEL: Multiple loops of mildly dilated and fluid-filled small bowel, as well as moderate distention of stomach and distal esophagus with fluid, which appears to be secondary to a gradual zone of transition occurring in the right hemiabdomen, near series 2 image 123, suggestive of a  mechanical partial small bowel obstruction. A high-grade zone of transition is not identified. There is a small amount of mesenteric fluid and edema, which is likely reactive. Extensive colonic diverticulosis, without evidence of acute diverticulitis. Appendix is normal and is contained within a small to moderate-sized right inguinal hernia.    Trace intraperitoneal free fluid, likely reactive. No intraperitoneal free air. Small fat-containing umbilical hernia. Small to moderate-sized left inguinal hernia, which contains some epiploic fat, as well as a single colonic diverticulum.    LYMPH NODES: Subcentimeter mesenteric lymph nodes and nonspecific haziness of the central mesentery, slightly increased from previous examination and likely reactive. A CT follow-up is recommended in 6 months to document stability or resolution of this finding.    VASCULATURE: No abdominal aortic aneurysm. Moderate atherosclerotic calcifications.    MUSCULOSKELETAL: No suspicious abnormality.    OTHER: No additionally significant abnormalities.    Impression    1.  Mechanical partial small bowel obstruction, with a gradual zone of transition occurring in the right hemiabdomen. Aspiration precautions recommended.  2.  Nonspecific haziness of the central mesentery, likely reactive. A CT follow-up is recommended in 6 months to document stability or resolution of this finding.  3.  Hepatic cirrhosis.  4.  Multiple bilateral renal lesions, some of which are indeterminate in attenuation. Renal protocol CT/MR imaging follow-up is recommended.  5.  Colonic diverticulosis.  6.  Moderate prostatomegaly.    ATTENTION: ABNORMAL REPORT    THE DICTATION ABOVE DESCRIBES AN ABNORMALITY FOR WHICH FOLLOWUP IS NEEDED.   CT External Imaging Abdomen    Narrative    Images were obtained from an external facility.  Click PACS Images   hyperlink to view images.  Textual results have been scanned into the   media tab.

## 2024-07-20 ENCOUNTER — APPOINTMENT (OUTPATIENT)
Dept: RADIOLOGY | Facility: HOSPITAL | Age: 79
DRG: 390 | End: 2024-07-20
Attending: SURGERY
Payer: COMMERCIAL

## 2024-07-20 LAB
ANION GAP SERPL CALCULATED.3IONS-SCNC: 12 MMOL/L (ref 7–15)
BASOPHILS # BLD AUTO: 0 10E3/UL (ref 0–0.2)
BASOPHILS NFR BLD AUTO: 1 %
BUN SERPL-MCNC: 14.2 MG/DL (ref 8–23)
CALCIUM SERPL-MCNC: 9.1 MG/DL (ref 8.8–10.4)
CHLORIDE SERPL-SCNC: 104 MMOL/L (ref 98–107)
CREAT SERPL-MCNC: 0.92 MG/DL (ref 0.67–1.17)
EGFRCR SERPLBLD CKD-EPI 2021: 86 ML/MIN/1.73M2
EOSINOPHIL # BLD AUTO: 0.2 10E3/UL (ref 0–0.7)
EOSINOPHIL NFR BLD AUTO: 2 %
ERYTHROCYTE [DISTWIDTH] IN BLOOD BY AUTOMATED COUNT: 14.1 % (ref 10–15)
GLUCOSE BLDC GLUCOMTR-MCNC: 118 MG/DL (ref 70–99)
GLUCOSE BLDC GLUCOMTR-MCNC: 138 MG/DL (ref 70–99)
GLUCOSE BLDC GLUCOMTR-MCNC: 146 MG/DL (ref 70–99)
GLUCOSE BLDC GLUCOMTR-MCNC: 149 MG/DL (ref 70–99)
GLUCOSE BLDC GLUCOMTR-MCNC: 152 MG/DL (ref 70–99)
GLUCOSE SERPL-MCNC: 139 MG/DL (ref 70–99)
HCO3 SERPL-SCNC: 24 MMOL/L (ref 22–29)
HCT VFR BLD AUTO: 43.2 % (ref 40–53)
HGB BLD-MCNC: 15.1 G/DL (ref 13.3–17.7)
IMM GRANULOCYTES # BLD: 0 10E3/UL
IMM GRANULOCYTES NFR BLD: 0 %
LYMPHOCYTES # BLD AUTO: 0.7 10E3/UL (ref 0.8–5.3)
LYMPHOCYTES NFR BLD AUTO: 9 %
MAGNESIUM SERPL-MCNC: 2.1 MG/DL (ref 1.7–2.3)
MCH RBC QN AUTO: 28.2 PG (ref 26.5–33)
MCHC RBC AUTO-ENTMCNC: 35 G/DL (ref 31.5–36.5)
MCV RBC AUTO: 81 FL (ref 78–100)
MONOCYTES # BLD AUTO: 1.4 10E3/UL (ref 0–1.3)
MONOCYTES NFR BLD AUTO: 18 %
NEUTROPHILS # BLD AUTO: 5.4 10E3/UL (ref 1.6–8.3)
NEUTROPHILS NFR BLD AUTO: 70 %
NRBC # BLD AUTO: 0 10E3/UL
NRBC BLD AUTO-RTO: 1 /100
PLAT MORPH BLD: ABNORMAL
PLATELET # BLD AUTO: ABNORMAL 10*3/UL
POTASSIUM SERPL-SCNC: 4 MMOL/L (ref 3.4–5.3)
RBC # BLD AUTO: 5.36 10E6/UL (ref 4.4–5.9)
RBC MORPH BLD: ABNORMAL
SODIUM SERPL-SCNC: 140 MMOL/L (ref 135–145)
WBC # BLD AUTO: 7.7 10E3/UL (ref 4–11)

## 2024-07-20 PROCEDURE — 36415 COLL VENOUS BLD VENIPUNCTURE: CPT | Performed by: INTERNAL MEDICINE

## 2024-07-20 PROCEDURE — 74018 RADEX ABDOMEN 1 VIEW: CPT

## 2024-07-20 PROCEDURE — 250N000013 HC RX MED GY IP 250 OP 250 PS 637: Performed by: INTERNAL MEDICINE

## 2024-07-20 PROCEDURE — 99231 SBSQ HOSP IP/OBS SF/LOW 25: CPT | Performed by: PHYSICIAN ASSISTANT

## 2024-07-20 PROCEDURE — 250N000011 HC RX IP 250 OP 636: Performed by: STUDENT IN AN ORGANIZED HEALTH CARE EDUCATION/TRAINING PROGRAM

## 2024-07-20 PROCEDURE — 120N000001 HC R&B MED SURG/OB

## 2024-07-20 PROCEDURE — 83735 ASSAY OF MAGNESIUM: CPT | Performed by: INTERNAL MEDICINE

## 2024-07-20 PROCEDURE — 99232 SBSQ HOSP IP/OBS MODERATE 35: CPT | Performed by: INTERNAL MEDICINE

## 2024-07-20 PROCEDURE — 85048 AUTOMATED LEUKOCYTE COUNT: CPT | Performed by: INTERNAL MEDICINE

## 2024-07-20 PROCEDURE — 250N000011 HC RX IP 250 OP 636: Performed by: INTERNAL MEDICINE

## 2024-07-20 PROCEDURE — 80048 BASIC METABOLIC PNL TOTAL CA: CPT | Performed by: INTERNAL MEDICINE

## 2024-07-20 RX ORDER — TRAZODONE HYDROCHLORIDE 50 MG/1
50 TABLET, FILM COATED ORAL
Status: DISCONTINUED | OUTPATIENT
Start: 2024-07-20 | End: 2024-07-21 | Stop reason: HOSPADM

## 2024-07-20 RX ADMIN — FAMOTIDINE 20 MG: 10 INJECTION, SOLUTION INTRAVENOUS at 09:34

## 2024-07-20 RX ADMIN — PROCHLORPERAZINE EDISYLATE 5 MG: 5 INJECTION INTRAMUSCULAR; INTRAVENOUS at 04:40

## 2024-07-20 RX ADMIN — PROCHLORPERAZINE EDISYLATE 5 MG: 5 INJECTION INTRAMUSCULAR; INTRAVENOUS at 22:49

## 2024-07-20 RX ADMIN — ONDANSETRON 4 MG: 4 TABLET, ORALLY DISINTEGRATING ORAL at 03:42

## 2024-07-20 RX ADMIN — DIATRIZOATE MEGLUMINE AND DIATRIZOATE SODIUM 120 ML: 660; 100 SOLUTION ORAL; RECTAL at 09:55

## 2024-07-20 RX ADMIN — SENNOSIDES AND DOCUSATE SODIUM 2 TABLET: 50; 8.6 TABLET ORAL at 03:42

## 2024-07-20 RX ADMIN — PROCHLORPERAZINE EDISYLATE 5 MG: 5 INJECTION INTRAMUSCULAR; INTRAVENOUS at 14:37

## 2024-07-20 RX ADMIN — ONDANSETRON 4 MG: 2 INJECTION INTRAMUSCULAR; INTRAVENOUS at 12:27

## 2024-07-20 RX ADMIN — TRAZODONE HYDROCHLORIDE 50 MG: 50 TABLET ORAL at 22:41

## 2024-07-20 RX ADMIN — HYDRALAZINE HYDROCHLORIDE 10 MG: 20 INJECTION INTRAMUSCULAR; INTRAVENOUS at 03:07

## 2024-07-20 RX ADMIN — PANTOPRAZOLE SODIUM 40 MG: 40 TABLET, DELAYED RELEASE ORAL at 07:50

## 2024-07-20 RX ADMIN — FAMOTIDINE 20 MG: 10 INJECTION, SOLUTION INTRAVENOUS at 22:41

## 2024-07-20 ASSESSMENT — ACTIVITIES OF DAILY LIVING (ADL)
ADLS_ACUITY_SCORE: 20

## 2024-07-20 NOTE — PLAN OF CARE
Problem: Adult Inpatient Plan of Care  Goal: Optimal Comfort and Wellbeing  7/20/2024 0601 by Daniella Norman RN  Outcome: Progressing  7/19/2024 2209 by Daniella Norman RN  Outcome: Progressing     Problem: Comorbidity Management  Goal: Blood Pressure in Desired Range  7/20/2024 0601 by Daniella Norman RN  Outcome: Progressing  7/19/2024 2209 by Daniella Norman RN  Outcome: Progressing  Intervention: Maintain Blood Pressure Management  Recent Flowsheet Documentation  Taken 7/19/2024 2313 by Daniella Norman RN  Medication Review/Management: medications reviewed  Taken 7/19/2024 1628 by Daniella Norman RN  Medication Review/Management: medications reviewed   Goal Outcome Evaluation:    Pt is A+Ox4, on RA. Abdomen remains distended. Complaining of nausea, dry heaving, abd discomfort- prn Zofran and compazine administered. Compazine seemed to have better effect.  BP elevated at 2300 and  0300, prn hydralazine given x2.     Pt up independently in the room, able to make his needs known.     Daniella Norman RN

## 2024-07-20 NOTE — PLAN OF CARE
Problem: Adult Inpatient Plan of Care  Goal: Optimal Comfort and Wellbeing  Outcome: Progressing     Problem: Intestinal Obstruction  Goal: Optimal Bowel Function  Outcome: Progressing  Intervention: Promote Bowel Function  Recent Flowsheet Documentation  Taken 7/19/2024 2104 by Daniella Norman RN  Body Position: position changed independently  Taken 7/19/2024 1628 by Daniella Norman RN  Body Position: position changed independently   Goal Outcome Evaluation:    Pt is A+Ox4, on RA. Denies pain, nausea or heartburn. Abdomen soft, less distended this evening. Had two loose BM's this shift. Advanced to clear liquid diet, tolerating well. Visited with family at bedside. Up independently in the room, able to make his needs known.     Daniella Norman RN

## 2024-07-20 NOTE — PLAN OF CARE
Problem: Comorbidity Management  Goal: Blood Pressure in Desired Range  Outcome: Progressing  Intervention: Maintain Blood Pressure Management  Recent Flowsheet Documentation  Taken 7/20/2024 0750 by Smita Vieira RN  Medication Review/Management: medications reviewed     Problem: Intestinal Obstruction  Goal: Optimal Bowel Function  Intervention: Promote Bowel Function  Recent Flowsheet Documentation  Taken 7/20/2024 0750 by Smita Vieira RN  Body Position: position changed independently   Goal Outcome Evaluation:                    A&Ox4. Up independently in room. Abdomen is distended but soft. Pt complained of intermittent nausea throughout shift. IV Zofran given x1, pt was able to fall asleep after administration. Denies pain. Gastrografin challenge ordered. Gastrografin given at 09:55. PRN Compazine given x1 after pt napped. Writer encouraged and educated pt to sit up in chair and ambulate in hallway to aid in bowel function.

## 2024-07-20 NOTE — PROGRESS NOTES
General Surgery Progress Note:    Hospital Day # 2    ASSESSMENT:   Partial SBO    Timur Bullard is a 78 year old male admitted with a partial small bowel obstruction.  Patient initially on clear liquids yesterday which she tolerated however overnight started feeling increased abdominal distention, pain and nausea with dry heaving.  Continues to pass flatus but no BMs.  Patient continues to refuse NG tube decompression.  Plan for oral Gastrografin challenge.    PLAN:   -Sips of clear liquids only.  -PO Gastrografin challenge  -If patient develops vomiting, please place nasogastric tube for decompression  -Increase activity and encourage ambulation to promote return of bowel function.  Encouraged patient to sit up in chair and walk in hallway several times a day.  -General surgery will continue to follow    SUBJECTIVE:   Timur Bullard is feeling a bit worse this morning.  He states that he was doing well yesterday and was going slow with clear liquids throughout the day.  He has continued to pass flatus but no bowel movement yet.  Yesterday evening and overnight he states he has had worsening abdominal pain and distention with associated nausea and dry heaving.  Also endorses significant belching this morning.  Denies fever, chills, vomiting.  Has not been out of bed yet this morning.    Patient Vitals for the past 24 hrs:   BP Temp Temp src Pulse Resp SpO2   07/20/24 0811 (!) 140/69 -- -- 86 -- --   07/20/24 0718 (!) 195/85 98.5  F (36.9  C) Oral 102 18 94 %   07/20/24 0334 (!) 144/66 -- -- 102 -- --   07/20/24 0303 (!) 169/78 -- -- 101 -- --   07/20/24 0204 (!) 178/84 98.1  F (36.7  C) Oral 108 20 94 %   07/19/24 2356 (!) 177/81 -- -- -- -- --   07/19/24 2313 (!) 169/87 97.7  F (36.5  C) Oral 88 16 95 %   07/19/24 1540 (!) 144/81 98.1  F (36.7  C) Oral -- 18 97 %   07/19/24 1220 (!) 142/69 -- -- 79 -- --       Physical Exam:  General: patient seen resting in bed, no acute distress  Resp: no respiratory  distress, breathing comfortably on room air  Abdomen: Softly distended, minimally tender to palpation. No rebound or guarding.  Extremities: warm and well perfused    Admission on 07/18/2024   Component Date Value    Sodium 07/19/2024 139     Potassium 07/19/2024 4.2     Carbon Dioxide (CO2) 07/19/2024 24     Anion Gap 07/19/2024 11     Urea Nitrogen 07/19/2024 18.3     Creatinine 07/19/2024 0.91     GFR Estimate 07/19/2024 87     Calcium 07/19/2024 8.7 (L)     Chloride 07/19/2024 104     Glucose 07/19/2024 196 (H)     Alkaline Phosphatase 07/19/2024 65     AST 07/19/2024 19     ALT 07/19/2024 27     Protein Total 07/19/2024 6.4     Albumin 07/19/2024 4.1     Bilirubin Total 07/19/2024 1.8 (H)     Magnesium 07/19/2024 1.9     WBC Count 07/19/2024 9.6     RBC Count 07/19/2024 5.13     Hemoglobin 07/19/2024 14.2     Hematocrit 07/19/2024 42.3     MCV 07/19/2024 83     MCH 07/19/2024 27.7     MCHC 07/19/2024 33.6     RDW 07/19/2024 13.9     Platelet Count 07/19/2024      % Neutrophils 07/19/2024 79     % Lymphocytes 07/19/2024 6     % Monocytes 07/19/2024 14     % Eosinophils 07/19/2024 1     % Basophils 07/19/2024 0     % Immature Granulocytes 07/19/2024 0     NRBCs per 100 WBC 07/19/2024 0     Absolute Neutrophils 07/19/2024 7.6     Absolute Lymphocytes 07/19/2024 0.6 (L)     Absolute Monocytes 07/19/2024 1.3     Absolute Eosinophils 07/19/2024 0.1     Absolute Basophils 07/19/2024 0.0     Absolute Immature Granul* 07/19/2024 0.0     Absolute NRBCs 07/19/2024 0.0     Hepatitis A Antibody IgM 07/19/2024 Nonreactive     Hepatitis B Core Antibod* 07/19/2024 Nonreactive     Hepatitis C Antibody 07/19/2024 Nonreactive     Hepatitis B Surface Anti* 07/19/2024 Nonreactive     GLUCOSE BY METER POCT 07/19/2024 169 (H)     GLUCOSE BY METER POCT 07/19/2024 153 (H)     GLUCOSE BY METER POCT 07/19/2024 169 (H)     GLUCOSE BY METER POCT 07/19/2024 215 (H)     Magnesium 07/20/2024 2.1     Sodium 07/20/2024 140     Potassium  07/20/2024 4.0     Chloride 07/20/2024 104     Carbon Dioxide (CO2) 07/20/2024 24     Anion Gap 07/20/2024 12     Urea Nitrogen 07/20/2024 14.2     Creatinine 07/20/2024 0.92     GFR Estimate 07/20/2024 86     Calcium 07/20/2024 9.1     Glucose 07/20/2024 139 (H)     GLUCOSE BY METER POCT 07/20/2024 152 (H)     WBC Count 07/20/2024 7.7     RBC Count 07/20/2024 5.36     Hemoglobin 07/20/2024 15.1     Hematocrit 07/20/2024 43.2     MCV 07/20/2024 81     MCH 07/20/2024 28.2     MCHC 07/20/2024 35.0     RDW 07/20/2024 14.1     Platelet Count 07/20/2024      % Neutrophils 07/20/2024 70     % Lymphocytes 07/20/2024 9     % Monocytes 07/20/2024 18     % Eosinophils 07/20/2024 2     % Basophils 07/20/2024 1     % Immature Granulocytes 07/20/2024 0     NRBCs per 100 WBC 07/20/2024 1 (H)     Absolute Neutrophils 07/20/2024 5.4     Absolute Lymphocytes 07/20/2024 0.7 (L)     Absolute Monocytes 07/20/2024 1.4 (H)     Absolute Eosinophils 07/20/2024 0.2     Absolute Basophils 07/20/2024 0.0     Absolute Immature Granul* 07/20/2024 0.0     Absolute NRBCs 07/20/2024 0.0     RBC Morphology 07/20/2024 Confirmed RBC Indices     Platelet Assessment 07/20/2024 Platelets Clumped (A)     GLUCOSE BY METER POCT 07/20/2024 146 (H)         Tayler Funez PA-C  [facility]  [address]  [address]  [place] [address]

## 2024-07-20 NOTE — PROGRESS NOTES
[facility]    Hospitalist Progress Note    Date of Service (when I saw the patient): 07/20/2024    Assessment & Plan   Timur Bullard is a pleasant 79 y/o gentleman with history of R renal malignancy s/p partial nephrectomy 05/2015, CAD, and prior SBO who was sent to the [facility] ER from urgent care on 7/19/2024 after he was found to have a partial SBO on CT AP.  He had presented there with abdominal distention and pain.  Current problems include:     SBO; improving  Diarrhea, following Gastrografin enema  Prior SBO  Urgent care CT AP with partial SBO with transition point in R hemiabdomen  - began clear liquid diet this afternoon; not eating much yet  - continue D5 LR 100cc/hr IV until PO intake adequate  - If Sx return or develops further/worsening nausea, will proceed with NG tube  - appreciate Surgery follow up today; will ask them to see Timur tomorrow re. his questions (cause of this SBO? adhesions?  other?)  - increase activity as soon as able; wants to do a walk this evening     DM type II  - PTA was on: metformin 500mg BID; on hold  - continue sliding scale insulin q4h  - continue hypoglycemic protocol     Hepatic cirrhosis  - per prior: Urgent care CT AP WO 7/18/2024 showed cirrosis  - prior CT AP with hepatic steatosis  - needs outpatient referral to GI for ongoing management (pt has care at [facility] so he will call for an appt)  - review hepatitis serologies     Bilateral renal lesions (cysts on 7/19 renal mass CT)  Hx of malignant neoplasm R kidney  - s/p partial R nephrectomy 05/2015  - CT renal mass protocol reviewed; will ask Nephrology to advise on further workup (if needed) 7/21    Chronic problems:     CAD s/p PCI mid LAD 3/23/2023   - continue ASA    Hx of PVCs   - continue Verapamil 120mg daily PO    Essential HTN   - PTA amlodipine 5mg at bedtime on hold  - continue metoprolol ER 25mg daily PO, verapamil 120mg daily    Anxiety   - continue PRN lorazepam 1mg at  "bedtime    Insomnia  - add low-dose PRN trazodone    BPH        Diet: NPO for Medical/Clinical Reasons Except for: Ice Chips    DVT Prophylaxis: Pneumatic Compression Devices  Polo Catheter: Not present  Lines: None     Cardiac Monitoring: ACTIVE order. Indication: Tachyarrhythmias, acute (48 hours)  Code Status: Full Code          Clinically Significant Risk Factors Present on Admission      Overweight: Estimated body mass index is 26.7 kg/m  as calculated from the following:    Height as of this encounter: 1.778 m (5' 10\").    Weight as of this encounter: 84.4 kg (186 lb 1.1 oz).               Disposition Plan     Medically Ready for Discharge: anticipated in 1-2 days    Disposition: Expected discharge in 1-2 days.        CRYSTAL Linn MD, [facility] Hospitalist  Text Page (7am - 6pm)    Interval History   Feeling somewhat better this afternoon/early evening.  Had ongoing nausea, distention this morning; says compazine helped.  Was given gastrografin and then had multiple (\"a half dozen\") loose BM's.  Appetite only fair.  Hasn't slept for four days; wants help with this.  Has multiple questions about what may have caused his (recurrent) SBO.    Data reviewed today: I reviewed all new labs and imaging results over the last 24 hours.     Physical Exam   Temp: 98.5  F (36.9  C) Temp src: Oral BP: (!) 140/69 Pulse: 86   Resp: 18 SpO2: 94 % O2 Device: None (Room air)    Vitals:    07/19/24 0038   Weight: 84.4 kg (186 lb 1.1 oz)     Vital Signs with Ranges  Temp:  [97.7  F (36.5  C)-98.5  F (36.9  C)] 98.5  F (36.9  C)  Pulse:  [] 86  Resp:  [16-20] 18  BP: (140-195)/(66-87) 140/69  SpO2:  [94 %-97 %] 94 %  I/O last 3 completed shifts:  In: 1511 [P.O.:600; I.V.:911]  Out: -     Constitutional: awake, no apparent distress; lying in bed  HEENT: sclerae clear; MM's moist  Respiratory: good a/e bilaterally, no wheezing or rhonchi  Cardiovascular: regular rate and rhythm, S1, S2 noted; no m/r/g  GI: " abdomen protuberant; + bowel sounds; soft, non-tender; mild distention  Skin/Integumen: no rashes, no cyanosis, no jaundice  Musculoskeletal: no edema  Neurologic: follows directions well; no focal deficits      Medications   Current Facility-Administered Medications   Medication Dose Route Frequency Provider Last Rate Last Admin     Current Facility-Administered Medications   Medication Dose Route Frequency Provider Last Rate Last Admin    famotidine (PEPCID) injection 20 mg  20 mg Intravenous Q12H Yareli Kelly MD   20 mg at 07/19/24 2109    insulin aspart (NovoLOG) injection (RAPID ACTING)  1-7 Units Subcutaneous 4x Daily AC & HS Yayo Pacheco MD   2 Units at 07/19/24 2108    pantoprazole (PROTONIX) EC tablet 40 mg  40 mg Oral QAM AC Yayo Pacheco MD   40 mg at 07/20/24 0750    sodium chloride (PF) 0.9% PF flush 3 mL  3 mL Intracatheter Q8H Yareli Kelly MD   3 mL at 07/19/24 2111       Data   Recent Labs   Lab 07/20/24  0619 07/20/24  0212 07/19/24 2107 07/19/24  0931 07/19/24  0653   WBC 7.7  --   --   --  9.6   HGB 15.1  --   --   --  14.2   MCV 81  --   --   --  83     --   --   --  139   POTASSIUM 4.0  --   --   --  4.2   CHLORIDE 104  --   --   --  104   CO2 24  --   --   --  24   BUN 14.2  --   --   --  18.3   CR 0.92  --   --   --  0.91   ANIONGAP 12  --   --   --  11   SUSAN 9.1  --   --   --  8.7*   * 152* 215*   < > 196*   ALBUMIN  --   --   --   --  4.1   PROTTOTAL  --   --   --   --  6.4   BILITOTAL  --   --   --   --  1.8*   ALKPHOS  --   --   --   --  65   ALT  --   --   --   --  27   AST  --   --   --   --  19    < > = values in this interval not displayed.       Recent Results (from the past 24 hour(s))   CT Renal Mass wo & w Contrast    Narrative    EXAM: CT RENAL MASS WO and W CONTRAST  LOCATION: [facility]  DATE: 7/19/2024    INDICATION: Pt with a Hx of renal cancer s p partial R nephrectomy. Admission CT AP with findings of indeterminate  bilateral renal lesions  COMPARISON: 7/18/2024  TECHNIQUE: CT scan of the abdomen using renal mass protocol with pre contrast, arterial, portal venous, and delayed images. The pelvis was imaged during the portal venous phase. Multiplanar reformats were obtained. Dose reduction techniques were used.  CONTRAST: 90 mL Isovue 370    FINDINGS:    LOWER CHEST: Linear subsegmental atelectasis or scarring in both lower lobes. Partially imaged coronary artery calcification.    HEPATOBILIARY: Diffuse hepatic steatosis. No significant mass. No bile duct dilatation. No calcified gallstones.    PANCREAS: Normal.    SPLEEN: Normal.    ADRENAL GLANDS: Normal.    RIGHT KIDNEY/URETER: Postsurgical changes from partial nephrectomy in the upper pole. No suspicious enhancement or nodularity at the resection margin. Multiple homogeneous simple and hemorrhagic/proteinaceous cysts. No solid masses, urinary tract   calculi, collecting system dilation, urothelial thickening, or urothelial enhancement. No filling defects on the excretory phase.    LEFT KIDNEY/URETER: Multiple homogeneous simple and hemorrhagic/proteinaceous cysts. One of the high attenuation lesions in the lower pole shows apparent enhancement, but this is likely technical due to its small size and endophytic location. Stability   since 1/26/2020 indicates it is likely benign (5/98, 11/97). No solid masses, urinary tract calculi, collecting system dilation, urothelial thickening, or urothelial enhancement. No filling defects on the excretory phase.    BLADDER: Mildly trabeculated with a few small lateral wall diverticula. No perivesical fat stranding.    BOWEL: Diverticulosis of the colon. No acute inflammatory change. Persistent small bowel dilation with transition to decompressed distal small bowel in the right midabdomen. The degree of proximal dilation slightly decreased compared to 7/18/2024. No   extraluminal gas or fluid collection. Appendix in right inguinal hernia.      LYMPH NODES: Normal.    VASCULATURE: Moderate multifocal atherosclerotic calcification of the abdominal aorta and iliofemoral arteries.    PELVIC ORGANS: Prostatomegaly with fiducial markers    MUSCULOSKELETAL: Bilateral inguinal hernias containing a small amount of fluid, as well as the appendix right. Degenerative changes in the spine.      Impression    IMPRESSION:  1.  Right partial nephrectomy with bilateral simple and hemorrhagic/proteinaceous renal cysts (Bosniak I and II). No solid masses. Continued surveillance as indicated for the primary renal mass noting that MRI would be more sensitive given the extent and   complexity of the cystic lesions.    2.  Small bowel obstruction with slight decrease in the degree of upstream dilation compared to 7/18/2024.    3.  Hepatic steatosis.

## 2024-07-21 VITALS
WEIGHT: 186.07 LBS | TEMPERATURE: 98.5 F | HEIGHT: 70 IN | HEART RATE: 94 BPM | RESPIRATION RATE: 18 BRPM | SYSTOLIC BLOOD PRESSURE: 149 MMHG | BODY MASS INDEX: 26.64 KG/M2 | DIASTOLIC BLOOD PRESSURE: 75 MMHG | OXYGEN SATURATION: 97 %

## 2024-07-21 LAB
GLUCOSE BLDC GLUCOMTR-MCNC: 127 MG/DL (ref 70–99)
GLUCOSE BLDC GLUCOMTR-MCNC: 136 MG/DL (ref 70–99)
GLUCOSE BLDC GLUCOMTR-MCNC: 150 MG/DL (ref 70–99)
GLUCOSE BLDC GLUCOMTR-MCNC: 160 MG/DL (ref 70–99)
HOLD SPECIMEN: NORMAL
MAGNESIUM SERPL-MCNC: 2.1 MG/DL (ref 1.7–2.3)
POTASSIUM SERPL-SCNC: 3.8 MMOL/L (ref 3.4–5.3)

## 2024-07-21 PROCEDURE — 99239 HOSP IP/OBS DSCHRG MGMT >30: CPT | Performed by: INTERNAL MEDICINE

## 2024-07-21 PROCEDURE — 250N000013 HC RX MED GY IP 250 OP 250 PS 637: Performed by: INTERNAL MEDICINE

## 2024-07-21 PROCEDURE — 83735 ASSAY OF MAGNESIUM: CPT | Performed by: INTERNAL MEDICINE

## 2024-07-21 PROCEDURE — 250N000011 HC RX IP 250 OP 636: Performed by: INTERNAL MEDICINE

## 2024-07-21 PROCEDURE — 99231 SBSQ HOSP IP/OBS SF/LOW 25: CPT | Performed by: PHYSICIAN ASSISTANT

## 2024-07-21 PROCEDURE — 84132 ASSAY OF SERUM POTASSIUM: CPT | Performed by: INTERNAL MEDICINE

## 2024-07-21 PROCEDURE — 36415 COLL VENOUS BLD VENIPUNCTURE: CPT | Performed by: INTERNAL MEDICINE

## 2024-07-21 RX ORDER — SACCHAROMYCES BOULARDII 250 MG
250 CAPSULE ORAL 2 TIMES DAILY
Qty: 28 CAPSULE | Refills: 1 | Status: SHIPPED | OUTPATIENT
Start: 2024-07-21

## 2024-07-21 RX ORDER — PANTOPRAZOLE SODIUM 40 MG/1
40 TABLET, DELAYED RELEASE ORAL
Qty: 30 TABLET | Refills: 1 | Status: SHIPPED | OUTPATIENT
Start: 2024-07-22

## 2024-07-21 RX ORDER — POTASSIUM CHLORIDE 1500 MG/1
20 TABLET, EXTENDED RELEASE ORAL ONCE
Status: COMPLETED | OUTPATIENT
Start: 2024-07-21 | End: 2024-07-21

## 2024-07-21 RX ADMIN — POTASSIUM CHLORIDE 20 MEQ: 1500 TABLET, EXTENDED RELEASE ORAL at 10:02

## 2024-07-21 RX ADMIN — FAMOTIDINE 20 MG: 10 INJECTION, SOLUTION INTRAVENOUS at 09:29

## 2024-07-21 RX ADMIN — PANTOPRAZOLE SODIUM 40 MG: 40 TABLET, DELAYED RELEASE ORAL at 06:15

## 2024-07-21 ASSESSMENT — ACTIVITIES OF DAILY LIVING (ADL)
ADLS_ACUITY_SCORE: 20

## 2024-07-21 NOTE — PLAN OF CARE
Problem: Intestinal Obstruction  Goal: Optimal Bowel Function  Outcome: Progressing     Problem: Adult Inpatient Plan of Care  Goal: Optimal Comfort and Wellbeing  Outcome: Progressing   Goal Outcome Evaluation: Pt A&OX4. Denied pain, nausea. No BM this shift. . Tele Sinus with BBB. Pt independent in the room and reports feeling better this morning.

## 2024-07-21 NOTE — PROGRESS NOTES
General Surgery Progress Note:    Hospital Day # 3    ASSESSMENT:   No diagnosis found.    Timur Bullard is a 78 year old male admitted with a partial small bowel obstruction. Patient underwent PO Gastrografin challenge yesterday with follow-up abdominal x-ray revealing contrast throughout the colon and rectum and patient subsequently passing liquid stools. Tolerated a clear liquid diet this morning with ongoing antegrade bowel function.    PLAN:   - Regular diet as tolerated  - Increase activity and encourage ambulation to continue to promote bowel function  - General surgery will sign off at this time. Please page/call if further questions.    SUBJECTIVE:   Timur Bullard is doing well this morning. Denies abdominal pain, nausea, vomiting. Endorses improvement in abdominal distention and is passing flatus and had several episodes of diarrhea yesterday. No BM yet today. Tolerating a clear liquid diet for breakfast without issues. Ambulating and voiding independently.    Patient Vitals for the past 24 hrs:   BP Temp Temp src Pulse Resp SpO2   07/21/24 0720 135/67 98  F (36.7  C) Oral 74 16 90 %   07/21/24 0421 118/58 98.6  F (37  C) Oral 59 16 91 %   07/20/24 2338 (!) 143/69 98.2  F (36.8  C) Oral 79 20 96 %   07/20/24 1914 131/71 98.6  F (37  C) Oral 81 -- 96 %   07/20/24 1553 115/58 98.3  F (36.8  C) Oral 92 18 97 %   07/20/24 1122 121/68 97.5  F (36.4  C) Oral 101 20 94 %       Physical Exam:  General: patient seen resting in bed, no acute distress  Resp: no respiratory distress, breathing comfortably on room air  Abdomen: Softly distended, non-tender to palpation. No rebound or guarding.  Extremities: warm and well perfused    Admission on 07/18/2024   Component Date Value    Sodium 07/19/2024 139     Potassium 07/19/2024 4.2     Carbon Dioxide (CO2) 07/19/2024 24     Anion Gap 07/19/2024 11     Urea Nitrogen 07/19/2024 18.3     Creatinine 07/19/2024 0.91     GFR Estimate 07/19/2024 87     Calcium  07/19/2024 8.7 (L)     Chloride 07/19/2024 104     Glucose 07/19/2024 196 (H)     Alkaline Phosphatase 07/19/2024 65     AST 07/19/2024 19     ALT 07/19/2024 27     Protein Total 07/19/2024 6.4     Albumin 07/19/2024 4.1     Bilirubin Total 07/19/2024 1.8 (H)     Magnesium 07/19/2024 1.9     WBC Count 07/19/2024 9.6     RBC Count 07/19/2024 5.13     Hemoglobin 07/19/2024 14.2     Hematocrit 07/19/2024 42.3     MCV 07/19/2024 83     MCH 07/19/2024 27.7     MCHC 07/19/2024 33.6     RDW 07/19/2024 13.9     Platelet Count 07/19/2024      % Neutrophils 07/19/2024 79     % Lymphocytes 07/19/2024 6     % Monocytes 07/19/2024 14     % Eosinophils 07/19/2024 1     % Basophils 07/19/2024 0     % Immature Granulocytes 07/19/2024 0     NRBCs per 100 WBC 07/19/2024 0     Absolute Neutrophils 07/19/2024 7.6     Absolute Lymphocytes 07/19/2024 0.6 (L)     Absolute Monocytes 07/19/2024 1.3     Absolute Eosinophils 07/19/2024 0.1     Absolute Basophils 07/19/2024 0.0     Absolute Immature Granul* 07/19/2024 0.0     Absolute NRBCs 07/19/2024 0.0     Hepatitis A Antibody IgM 07/19/2024 Nonreactive     Hepatitis B Core Antibod* 07/19/2024 Nonreactive     Hepatitis C Antibody 07/19/2024 Nonreactive     Hepatitis B Surface Anti* 07/19/2024 Nonreactive     GLUCOSE BY METER POCT 07/19/2024 169 (H)     GLUCOSE BY METER POCT 07/19/2024 153 (H)     GLUCOSE BY METER POCT 07/19/2024 169 (H)     GLUCOSE BY METER POCT 07/19/2024 215 (H)     Magnesium 07/20/2024 2.1     Sodium 07/20/2024 140     Potassium 07/20/2024 4.0     Chloride 07/20/2024 104     Carbon Dioxide (CO2) 07/20/2024 24     Anion Gap 07/20/2024 12     Urea Nitrogen 07/20/2024 14.2     Creatinine 07/20/2024 0.92     GFR Estimate 07/20/2024 86     Calcium 07/20/2024 9.1     Glucose 07/20/2024 139 (H)     GLUCOSE BY METER POCT 07/20/2024 152 (H)     WBC Count 07/20/2024 7.7     RBC Count 07/20/2024 5.36     Hemoglobin 07/20/2024 15.1     Hematocrit 07/20/2024 43.2     MCV 07/20/2024  81     MCH 07/20/2024 28.2     MCHC 07/20/2024 35.0     RDW 07/20/2024 14.1     Platelet Count 07/20/2024      % Neutrophils 07/20/2024 70     % Lymphocytes 07/20/2024 9     % Monocytes 07/20/2024 18     % Eosinophils 07/20/2024 2     % Basophils 07/20/2024 1     % Immature Granulocytes 07/20/2024 0     NRBCs per 100 WBC 07/20/2024 1 (H)     Absolute Neutrophils 07/20/2024 5.4     Absolute Lymphocytes 07/20/2024 0.7 (L)     Absolute Monocytes 07/20/2024 1.4 (H)     Absolute Eosinophils 07/20/2024 0.2     Absolute Basophils 07/20/2024 0.0     Absolute Immature Granul* 07/20/2024 0.0     Absolute NRBCs 07/20/2024 0.0     RBC Morphology 07/20/2024 Confirmed RBC Indices     Platelet Assessment 07/20/2024 Platelets Clumped (A)     GLUCOSE BY METER POCT 07/20/2024 146 (H)     GLUCOSE BY METER POCT 07/20/2024 138 (H)     GLUCOSE BY METER POCT 07/20/2024 149 (H)     GLUCOSE BY METER POCT 07/20/2024 118 (H)     Potassium 07/21/2024 3.8     Magnesium 07/21/2024 2.1     GLUCOSE BY METER POCT 07/21/2024 127 (H)     Hold Specimen 07/21/2024 JIC     GLUCOSE BY METER POCT 07/21/2024 136 (H)         Tayler Funez PA-C  [facility]  [facility]  [address]  [place] [address]

## 2024-07-21 NOTE — PLAN OF CARE
"  Problem: Comorbidity Management  Goal: Blood Pressure in Desired Range  Outcome: Progressing  Intervention: Maintain Blood Pressure Management  Recent Flowsheet Documentation  Taken 7/21/2024 0933 by Smita Vieira RN  Medication Review/Management: medications reviewed     Problem: Intestinal Obstruction  Goal: Optimal Bowel Function  Outcome: Progressing  Intervention: Promote Bowel Function  Recent Flowsheet Documentation  Taken 7/21/2024 0933 by Smita Vieira RN  Body Position: position changed independently  Goal: Fluid and Electrolyte Balance  Outcome: Progressing  Goal: Absence of Infection Signs and Symptoms  Outcome: Progressing  Goal: Optimize Nutrition Status  Outcome: Progressing  Goal: Optimal Pain Control and Function  Outcome: Progressing   Goal Outcome Evaluation:                      A&Ox4. Up independently. On tele, SR with BBB. Blood sugars were 136 and 160. Denies pain and nausea. Abdomen is still distended but soft and nontender. Pt states \"I feel so much better today and would like to go home\". Diet was advanced to regular, tolerating diet. Took a shower today. No BM this shift but is passing flatus.   "

## 2024-07-21 NOTE — PROVIDER NOTIFICATION
Nephrology  Consulted for kidney cyst evaluation  CT showed Right partial nephrectomy with bilateral simple and hemorrhagic/proteinaceous renal cysts (Bosniak I and II). No solid masses. Continued surveillance as indicated for the primary renal mass noting that MRI would be more sensitive given the extent and   complexity of the cystic lesions.    Will cancel nephrology consult and suggest urology consult    Jovan Deng MD  [facility] [place]  [phone]

## 2024-07-21 NOTE — DISCHARGE SUMMARY
[facility]    Discharge Summary  Hospitalist    Date of Admission:  7/18/2024  Date of Discharge:  7/21/2024  Discharging Provider:  CRYSTAL Linn MD, FACP     Date of Service (when I saw the patient): 07/21/24    Discharge Diagnoses     SBO  Diarrhea  Prior SBO  Probable GERD  DM type II  Hepatic cirrhosis  Bilateral renal lesions (cysts on 7/19 renal mass CT)  Hx of malignant neoplasm R kidney    Chronic problems:     CAD  Hx of PVCs   Essential HTN   Anxiety   Insomnia  BPH      History of Present Illness   Per 7/18 H & P:  Timur Bullard is a pleasant 77 y/o gentleman with history of R renal malignancy s/p partial nephrectomy 05/2015, CAD, and prior SBO who was sent to the [facility] ER from urgent care on 7/19/2024 after he was found to have a partial SBO on CT AP.  He had presented there with abdominal distention and pain with nausea and vomiting.    Hospital Course   Timur Bullard was admitted on 7/18/2024.  The following problems were addressed during his hospitalization:     SBO; improving  Diarrhea, following Gastrografin enema  Prior SBO  Probable GERD  - Urgent care CT AP showed partial SBO with transition point in R mid-abdomen  - General Surgery was consulted and followed during admission  - initially was NPO and treated with IVF  - underwent gastrografin enema and follow through imaging, and then had limited diarrhea  - diet was gradually advanced; was tolerating regular diet at time of discharge  - multiple PTA supplements were held until these can be reviewed at PCP follow up  - was started on PPI and probiotics prior to discharge     DM type II  - PTA was on: metformin 500mg BID; on hold  - initially covered with sliding scale insulin q 4 h until he had resumed PO intake     Hepatic cirrhosis  - per prior: Urgent care CT AP WO 7/18/2024 showed cirrosis  - prior CT AP with hepatic steatosis  - needs outpatient referral to GI for ongoing management (pt has care at [facility] so  he will call for an appt)  - review hepatitis serologies     Bilateral renal lesions (cysts on 7/19 renal mass CT)  Hx of malignant neoplasm R kidney  - s/p partial R nephrectomy 05/2015  - Nephrology consulted -> needs referral to Urology for further eval.     Chronic problems:     CAD s/p PCI mid LAD 3/23/2023   - continued ASA     Hx of PVCs   - continued Verapamil 120mg daily PO     Essential HTN   - PTA amlodipine 5mg at bedtime held initially, then resumed at discharge  - continue metoprolol ER 25mg daily PO, verapamil 120mg daily     Anxiety   - continued PRN lorazepam 1mg at bedtime     Insomnia  - began low-dose PRN trazodone     BPH  - no issues during this admission            CRYSTAL Linn MD, [place] Hospitalist    Significant Results and Procedures   See below and in EHR    Pending Results   None    Code Status   Full Code       Primary Care Physician   Juan Chase    Physical Exam     Vitals:    07/19/24 0038   Weight: 84.4 kg (186 lb 1.1 oz)     98.5  F (36.9  C) -- -- -- 149/75 Abnormal  18 97 % -- None (Room air)     Constitutional: awake, no apparent distress; lying in bed  HEENT: sclerae clear; MM's moist  Respiratory: good a/e bilaterally, no wheezing or rhonchi  Cardiovascular: regular rate and rhythm, S1, S2 noted; no m/r/g  GI: abdomen protuberant; + bowel sounds; soft, non-tender, non-distended  Skin/Integumen: no rashes, no cyanosis, no jaundice  Musculoskeletal: no edema  Neurologic: follows directions well; no focal deficits     Discharge Disposition   Discharged to home  Condition at discharge: Stable    Consultations This Hospital Stay   SURGERY GENERAL IP CONSULT  NEPHROLOGY IP CONSULT    Time Spent on this Encounter   IJake MD, personally saw the patient today and spent greater than 30 minutes discharging this patient.    Discharge Orders      Reason for your hospital stay    You were admitted for evaluation of abdominal pain and other medical  issues, and have made good progress.     Activity    Your activity upon discharge: activity as tolerated     Follow-up and recommended labs and tests     1. Follow up with primary care provider, Juan Chase, within 7 days to evaluate medication change, to evaluate treatment change, for hospital follow- up, regarding new diagnosis, and to follow up on results.    2. The following labs/tests are recommended: BMP.    3. Referral to [facility] and/or follow up re.: hepatic cirrhosis noted on recent/previous imaging studies, next 2-3 weeks  4. Referral to Urology re. Bilateral renal cysts - in next 2-3 weeks     Diet    Follow this diet upon discharge:       Regular Diet Adult; should be mod-consistent carbohydrate     Discharge Medications   Discharge Medication List as of 7/21/2024  7:08 PM        START taking these medications    Details   pantoprazole (PROTONIX) 40 MG EC tablet Take 1 tablet (40 mg) by mouth every morning (before breakfast), Disp-30 tablet, R-1, E-Prescribe      saccharomyces boulardii (FLORASTOR) 250 MG capsule Take 1 capsule (250 mg) by mouth 2 times daily, Disp-28 capsule, R-1, E-Prescribe           CONTINUE these medications which have NOT CHANGED    Details   albuterol (PROAIR HFA;PROVENTIL HFA;VENTOLIN HFA) 90 mcg/actuation inhaler [ALBUTEROL (PROAIR HFA;PROVENTIL HFA;VENTOLIN HFA) 90 MCG/ACTUATION INHALER] Inhale 1-2 puffs every 4 (four) hours as needed for wheezing., HistoricalMay substitute the equivalent medication per insurance preference.      amLODIPine (NORVASC) 5 MG tablet [AMLODIPINE (NORVASC) 5 MG TABLET] Take 5 mg by mouth at bedtime., Historical      b complex vitamins tablet [B COMPLEX VITAMINS TABLET] Take 1 tablet by mouth daily., Historical      cholecalciferol, vitamin D3, 1,000 unit tablet [CHOLECALCIFEROL, VITAMIN D3, 1,000 UNIT TABLET] Take 1,000 Units by mouth 2 (two) times a day., Historical      LORazepam (ATIVAN) 1 MG tablet [LORAZEPAM (ATIVAN) 1 MG TABLET] Take 1 mg by  "mouth at bedtime as needed for anxiety., Historical      metFORMIN (GLUCOPHAGE) 500 MG tablet Take 500 mg by mouth 2 times daily (with meals), Historical      metoprolol succinate ER (TOPROL-XL) 25 MG 24 hr tablet Take 25 mg by mouth daily, Historical      nitroGLYcerin (NITROSTAT) 0.4 MG sublingual tablet Place 0.4 mg under the tongue every 5 minutes as needed for chest pain For chest pain place 1 tablet under the tongue every 5 minutes for 3 doses. If symptoms persist 5 minutes after 1st dose call 911., Historical      verapamil ER (VERELAN) 120 MG 24 hr capsule Take 120 mg by mouth every morning, Historical           STOP taking these medications       arginine 500 mg tablet Comments:   Reason for Stopping:         coQ10, ubiquinol, 100 mg cap Comments:   Reason for Stopping:         fish oil-omega-3 fatty acids 500 MG capsule Comments:   Reason for Stopping:         levocetirizine (XYZAL) 5 MG tablet Comments:   Reason for Stopping:         magnesium 200 mg Tab Comments:   Reason for Stopping:         potassium citrate (UROCIT-K) 10 MEQ (1080 MG) CR tablet Comments:   Reason for Stopping:         selenium 200 mcg TbEC Comments:   Reason for Stopping:         vitamin E 400 unit capsule Comments:   Reason for Stopping:             Allergies   Allergies   Allergen Reactions    Ciprofloxacin Other (See Comments)     \"I get loopy\"    Gadolinium-Containing Contrast Media [Gadolinium Derivatives] Hives    Percocet [Oxycodone-Acetaminophen] Other (See Comments)     Delusional      Data   Most Recent 3 CBC's:  Recent Labs   Lab Test 07/20/24  0619 07/19/24  0653 01/28/20  0623   WBC 7.7 9.6 10.3   HGB 15.1 14.2 12.4*   MCV 81 83 84   PLT  --   --  161      Most Recent 3 BMP's:  Recent Labs   Lab Test 07/21/24  1751 07/21/24  1147 07/21/24  0843 07/21/24  0714 07/20/24  0914 07/20/24  0619 07/19/24  0931 07/19/24  0653 01/28/20  0856 01/28/20  0623   NA  --   --   --   --   --  140  --  139  --  139   POTASSIUM  --   --   " --  3.8  --  4.0  --  4.2  --  4.3   CHLORIDE  --   --   --   --   --  104  --  104  --  109*   CO2  --   --   --   --   --  24  --  24  --  23   BUN  --   --   --   --   --  14.2  --  18.3  --  17   CR  --   --   --   --   --  0.92  --  0.91  --  1.08   ANIONGAP  --   --   --   --   --  12  --  11  --  7   SUSAN  --   --   --   --   --  9.1  --  8.7*  --  8.5   * 160* 136*  --    < > 139*   < > 196*   < > 167*    < > = values in this interval not displayed.     Most Recent 2 LFT's:  Recent Labs   Lab Test 07/19/24  0653   AST 19   ALT 27   ALKPHOS 65   BILITOTAL 1.8*     Most Recent TSH, T4 and A1c Labs:  Recent Labs   Lab Test 01/28/20  0623   A1C 9.2*     Results for orders placed or performed during the hospital encounter of 07/18/24     CT External Imaging Abdomen    Narrative    Images were obtained from an external facility.  Click PACS Images   hyperlink to view images.  Textual results have been scanned into the   media tab.     CT Renal Mass wo & w Contrast    Narrative    EXAM: CT RENAL MASS WO and W CONTRAST  LOCATION: [facility]  DATE: 7/19/2024    INDICATION: Pt with a Hx of renal cancer s p partial R nephrectomy. Admission CT AP with findings of indeterminate bilateral renal lesions  COMPARISON: 7/18/2024  TECHNIQUE: CT scan of the abdomen using renal mass protocol with pre contrast, arterial, portal venous, and delayed images. The pelvis was imaged during the portal venous phase. Multiplanar reformats were obtained. Dose reduction techniques were used.  CONTRAST: 90 mL Isovue 370    FINDINGS:    LOWER CHEST: Linear subsegmental atelectasis or scarring in both lower lobes. Partially imaged coronary artery calcification.    HEPATOBILIARY: Diffuse hepatic steatosis. No significant mass. No bile duct dilatation. No calcified gallstones.    PANCREAS: Normal.    SPLEEN: Normal.    ADRENAL GLANDS: Normal.    RIGHT KIDNEY/URETER: Postsurgical changes from partial nephrectomy in  the upper pole. No suspicious enhancement or nodularity at the resection margin. Multiple homogeneous simple and hemorrhagic/proteinaceous cysts. No solid masses, urinary tract   calculi, collecting system dilation, urothelial thickening, or urothelial enhancement. No filling defects on the excretory phase.    LEFT KIDNEY/URETER: Multiple homogeneous simple and hemorrhagic/proteinaceous cysts. One of the high attenuation lesions in the lower pole shows apparent enhancement, but this is likely technical due to its small size and endophytic location. Stability   since 1/26/2020 indicates it is likely benign (5/98, 11/97). No solid masses, urinary tract calculi, collecting system dilation, urothelial thickening, or urothelial enhancement. No filling defects on the excretory phase.    BLADDER: Mildly trabeculated with a few small lateral wall diverticula. No perivesical fat stranding.    BOWEL: Diverticulosis of the colon. No acute inflammatory change. Persistent small bowel dilation with transition to decompressed distal small bowel in the right midabdomen. The degree of proximal dilation slightly decreased compared to 7/18/2024. No   extraluminal gas or fluid collection. Appendix in right inguinal hernia.     LYMPH NODES: Normal.    VASCULATURE: Moderate multifocal atherosclerotic calcification of the abdominal aorta and iliofemoral arteries.    PELVIC ORGANS: Prostatomegaly with fiducial markers    MUSCULOSKELETAL: Bilateral inguinal hernias containing a small amount of fluid, as well as the appendix right. Degenerative changes in the spine.      Impression    IMPRESSION:  1.  Right partial nephrectomy with bilateral simple and hemorrhagic/proteinaceous renal cysts (Bosniak I and II). No solid masses. Continued surveillance as indicated for the primary renal mass noting that MRI would be more sensitive given the extent and   complexity of the cystic lesions.    2.  Small bowel obstruction with slight decrease in the  degree of upstream dilation compared to 7/18/2024.    3.  Hepatic steatosis.       XR Gastrografin Challenge    Narrative    EXAM: XR GASTROGRAFIN CHALLENGE  LOCATION: [facility]  DATE: 7/20/2024    INDICATION: Small Bowel Obstruction  COMPARISON: CT of the abdomen and pelvis 7/18/2024 and 7/19/2024  TECHNIQUE: Routine water soluble contrast follow-through challenge.      Impression    IMPRESSION:    1.  Water soluble contrast material is present throughout the colon including the rectum 8 hours post administration.  2.  There are several mildly dilated loops of small bowel in the central abdomen, similar to the recent CT.

## 2024-07-21 NOTE — PLAN OF CARE
"Goal Outcome Evaluation:    Pt denies pain or nausea most of the shift. Request compazine before bed for mild nausea. Had a few loose/ diarrhea BM's and reported that the stool \"was a steady stream\" along w/ urine output. MD aware. Pt reports feeling much better today after having BM's. Tolerating clear liquid diet after XR. BG ac 149 and hs 118 Tele sinus w/ BBB. VSS on RA he has been independent in the room.      Problem: Adult Inpatient Plan of Care  Goal: Plan of Care Review  Description: The Plan of Care Review/Shift note should be completed every shift.  The Outcome Evaluation is a brief statement about your assessment that the patient is improving, declining, or no change.  This information will be displayed automatically on your shift  note.  Outcome: Progressing  Flowsheets (Taken 7/20/2024 2330)  Plan of Care Reviewed With:   patient   spouse  Overall Patient Progress: improving     Problem: Adult Inpatient Plan of Care  Goal: Absence of Hospital-Acquired Illness or Injury  Intervention: Prevent and Manage VTE (Venous Thromboembolism) Risk  Recent Flowsheet Documentation  Taken 7/20/2024 1618 by Gina Montenegro, RN  VTE Prevention/Management: (pt ambulating ind.) other (see comments)     Problem: Adult Inpatient Plan of Care  Goal: Optimal Comfort and Wellbeing  Outcome: Progressing     Problem: Adult Inpatient Plan of Care  Goal: Readiness for Transition of Care  Outcome: Progressing     Problem: Intestinal Obstruction  Goal: Optimal Bowel Function  Outcome: Progressing         Plan of Care Reviewed With: patient, spouse    Overall Patient Progress: improvingOverall Patient Progress: improving           "

## 2024-07-23 ENCOUNTER — PATIENT OUTREACH (OUTPATIENT)
Dept: CARE COORDINATION | Facility: CLINIC | Age: 79
End: 2024-07-23
Payer: COMMERCIAL

## 2024-07-23 NOTE — PROGRESS NOTES
[facility]:   [facility] Contact  [facility]     Clinical Data: Post-Discharge Outreach     Outreach attempted x 2.  Unable to leave [facility].      Plan:  [facility] will do no further outreaches at this time.       EZIO Clayton (she/her/hers)  Social Work Clinic Care Coordinator   [facility]  [email or web address]  [phone]      *Connected Care Resource Team does NOT follow patient ongoing. Referrals are identified based on internal discharge reports and the outreach is to ensure patient has an understanding of their discharge instructions.

## 2025-02-18 ENCOUNTER — HOSPITAL ENCOUNTER (INPATIENT)
Facility: HOSPITAL | Age: 80
LOS: 2 days | Discharge: HOME | DRG: 390 | End: 2025-02-20
Attending: EMERGENCY MEDICINE
Payer: COMMERCIAL

## 2025-02-18 DIAGNOSIS — K56.609 SBO (SMALL BOWEL OBSTRUCTION) (H): ICD-10-CM

## 2025-02-18 PROBLEM — I49.3 PREMATURE VENTRICULAR CONTRACTION: Status: ACTIVE | Noted: 2025-02-18

## 2025-02-18 PROBLEM — E78.5 HYPERLIPIDEMIA: Status: ACTIVE | Noted: 2025-02-18

## 2025-02-18 PROBLEM — E11.9 TYPE 2 DIABETES MELLITUS WITHOUT COMPLICATION, WITHOUT LONG-TERM CURRENT USE OF INSULIN (H): Status: ACTIVE | Noted: 2025-02-18

## 2025-02-18 PROBLEM — K76.0 HEPATIC STEATOSIS: Status: ACTIVE | Noted: 2025-02-18

## 2025-02-18 PROBLEM — I25.10 CORONARY ARTERY DISEASE INVOLVING NATIVE CORONARY ARTERY OF NATIVE HEART WITHOUT ANGINA PECTORIS: Status: ACTIVE | Noted: 2025-02-18

## 2025-02-18 LAB
CREAT SERPL-MCNC: 0.99 MG/DL (ref 0.67–1.17)
EGFRCR SERPLBLD CKD-EPI 2021: 78 ML/MIN/1.73M2
GLUCOSE BLDC GLUCOMTR-MCNC: 92 MG/DL (ref 70–99)
PLATELET # BLD AUTO: 65 10E3/UL (ref 150–450)

## 2025-02-18 PROCEDURE — G0378 HOSPITAL OBSERVATION PER HR: HCPCS

## 2025-02-18 PROCEDURE — 120N000001 HC R&B MED SURG/OB

## 2025-02-18 PROCEDURE — 99222 1ST HOSP IP/OBS MODERATE 55: CPT | Performed by: INTERNAL MEDICINE

## 2025-02-18 PROCEDURE — 250N000011 HC RX IP 250 OP 636: Performed by: EMERGENCY MEDICINE

## 2025-02-18 PROCEDURE — 82565 ASSAY OF CREATININE: CPT | Performed by: INTERNAL MEDICINE

## 2025-02-18 PROCEDURE — 36415 COLL VENOUS BLD VENIPUNCTURE: CPT | Performed by: INTERNAL MEDICINE

## 2025-02-18 PROCEDURE — 85049 AUTOMATED PLATELET COUNT: CPT | Performed by: INTERNAL MEDICINE

## 2025-02-18 PROCEDURE — 250N000011 HC RX IP 250 OP 636: Performed by: INTERNAL MEDICINE

## 2025-02-18 PROCEDURE — 258N000003 HC RX IP 258 OP 636: Performed by: INTERNAL MEDICINE

## 2025-02-18 PROCEDURE — 99285 EMERGENCY DEPT VISIT HI MDM: CPT | Mod: 25

## 2025-02-18 PROCEDURE — 82962 GLUCOSE BLOOD TEST: CPT

## 2025-02-18 RX ORDER — POTASSIUM CITRATE 10 MEQ/1
10 TABLET, EXTENDED RELEASE ORAL 2 TIMES DAILY
COMMUNITY

## 2025-02-18 RX ORDER — ONDANSETRON 2 MG/ML
8 INJECTION INTRAMUSCULAR; INTRAVENOUS ONCE
Status: DISCONTINUED | OUTPATIENT
Start: 2025-02-18 | End: 2025-02-18

## 2025-02-18 RX ORDER — ONDANSETRON 2 MG/ML
4 INJECTION INTRAMUSCULAR; INTRAVENOUS EVERY 6 HOURS PRN
Status: DISCONTINUED | OUTPATIENT
Start: 2025-02-18 | End: 2025-02-20 | Stop reason: HOSPADM

## 2025-02-18 RX ORDER — SODIUM CHLORIDE 9 MG/ML
INJECTION, SOLUTION INTRAVENOUS ONCE
Status: DISCONTINUED | OUTPATIENT
Start: 2025-02-18 | End: 2025-02-18

## 2025-02-18 RX ORDER — NALOXONE HYDROCHLORIDE 0.4 MG/ML
0.4 INJECTION, SOLUTION INTRAMUSCULAR; INTRAVENOUS; SUBCUTANEOUS
Status: DISCONTINUED | OUTPATIENT
Start: 2025-02-18 | End: 2025-02-20 | Stop reason: HOSPADM

## 2025-02-18 RX ORDER — LEVOCETIRIZINE DIHYDROCHLORIDE 5 MG/1
5 TABLET, FILM COATED ORAL EVERY EVENING
COMMUNITY

## 2025-02-18 RX ORDER — HYDROMORPHONE HYDROCHLORIDE 1 MG/ML
0.5 INJECTION, SOLUTION INTRAMUSCULAR; INTRAVENOUS; SUBCUTANEOUS
Status: DISCONTINUED | OUTPATIENT
Start: 2025-02-18 | End: 2025-02-19

## 2025-02-18 RX ORDER — DEXTROSE MONOHYDRATE 25 G/50ML
25-50 INJECTION, SOLUTION INTRAVENOUS
Status: DISCONTINUED | OUTPATIENT
Start: 2025-02-18 | End: 2025-02-20 | Stop reason: HOSPADM

## 2025-02-18 RX ORDER — NALOXONE HYDROCHLORIDE 0.4 MG/ML
0.2 INJECTION, SOLUTION INTRAMUSCULAR; INTRAVENOUS; SUBCUTANEOUS
Status: DISCONTINUED | OUTPATIENT
Start: 2025-02-18 | End: 2025-02-20 | Stop reason: HOSPADM

## 2025-02-18 RX ORDER — LIDOCAINE 40 MG/G
CREAM TOPICAL
Status: DISCONTINUED | OUTPATIENT
Start: 2025-02-18 | End: 2025-02-20 | Stop reason: HOSPADM

## 2025-02-18 RX ORDER — METOCLOPRAMIDE HYDROCHLORIDE 5 MG/ML
10 INJECTION INTRAMUSCULAR; INTRAVENOUS ONCE
Status: COMPLETED | OUTPATIENT
Start: 2025-02-18 | End: 2025-02-18

## 2025-02-18 RX ORDER — ENOXAPARIN SODIUM 100 MG/ML
40 INJECTION SUBCUTANEOUS EVERY 24 HOURS
Status: DISCONTINUED | OUTPATIENT
Start: 2025-02-18 | End: 2025-02-20 | Stop reason: HOSPADM

## 2025-02-18 RX ORDER — NICOTINE POLACRILEX 4 MG
15-30 LOZENGE BUCCAL
Status: DISCONTINUED | OUTPATIENT
Start: 2025-02-18 | End: 2025-02-20 | Stop reason: HOSPADM

## 2025-02-18 RX ORDER — HYDROMORPHONE HCL IN WATER/PF 6 MG/30 ML
0.2 PATIENT CONTROLLED ANALGESIA SYRINGE INTRAVENOUS EVERY 4 HOURS PRN
Status: DISCONTINUED | OUTPATIENT
Start: 2025-02-18 | End: 2025-02-19

## 2025-02-18 RX ORDER — LIDOCAINE HYDROCHLORIDE 20 MG/ML
10 JELLY TOPICAL ONCE
Status: DISCONTINUED | OUTPATIENT
Start: 2025-02-18 | End: 2025-02-18

## 2025-02-18 RX ORDER — DEXTROSE MONOHYDRATE AND SODIUM CHLORIDE 5; .9 G/100ML; G/100ML
INJECTION, SOLUTION INTRAVENOUS CONTINUOUS
Status: DISCONTINUED | OUTPATIENT
Start: 2025-02-18 | End: 2025-02-19

## 2025-02-18 RX ORDER — ROSUVASTATIN CALCIUM 20 MG/1
20 TABLET, COATED ORAL EVERY EVENING
COMMUNITY

## 2025-02-18 RX ORDER — DIPHENHYDRAMINE HYDROCHLORIDE 50 MG/ML
25 INJECTION INTRAMUSCULAR; INTRAVENOUS ONCE
Status: COMPLETED | OUTPATIENT
Start: 2025-02-18 | End: 2025-02-18

## 2025-02-18 RX ORDER — ASPIRIN 81 MG/1
81 TABLET ORAL EVERY MORNING
COMMUNITY

## 2025-02-18 RX ORDER — ONDANSETRON 4 MG/1
4 TABLET, ORALLY DISINTEGRATING ORAL EVERY 6 HOURS PRN
Status: DISCONTINUED | OUTPATIENT
Start: 2025-02-18 | End: 2025-02-20 | Stop reason: HOSPADM

## 2025-02-18 RX ORDER — SODIUM CHLORIDE 9 MG/ML
1000 INJECTION, SOLUTION INTRAVENOUS CONTINUOUS
Status: DISCONTINUED | OUTPATIENT
Start: 2025-02-18 | End: 2025-02-18

## 2025-02-18 RX ORDER — PROCHLORPERAZINE MALEATE 5 MG/1
5 TABLET ORAL EVERY 6 HOURS PRN
Status: DISCONTINUED | OUTPATIENT
Start: 2025-02-18 | End: 2025-02-20 | Stop reason: HOSPADM

## 2025-02-18 RX ADMIN — ENOXAPARIN SODIUM 40 MG: 40 INJECTION SUBCUTANEOUS at 20:32

## 2025-02-18 RX ADMIN — DEXTROSE AND SODIUM CHLORIDE: 5; 900 INJECTION, SOLUTION INTRAVENOUS at 18:52

## 2025-02-18 RX ADMIN — METOCLOPRAMIDE 10 MG: 5 INJECTION, SOLUTION INTRAMUSCULAR; INTRAVENOUS at 18:11

## 2025-02-18 RX ADMIN — DIPHENHYDRAMINE HYDROCHLORIDE 25 MG: 50 INJECTION INTRAMUSCULAR; INTRAVENOUS at 18:06

## 2025-02-18 ASSESSMENT — ACTIVITIES OF DAILY LIVING (ADL)
ADLS_ACUITY_SCORE: 52

## 2025-02-18 ASSESSMENT — COLUMBIA-SUICIDE SEVERITY RATING SCALE - C-SSRS
2. HAVE YOU ACTUALLY HAD ANY THOUGHTS OF KILLING YOURSELF IN THE PAST MONTH?: NO
6. HAVE YOU EVER DONE ANYTHING, STARTED TO DO ANYTHING, OR PREPARED TO DO ANYTHING TO END YOUR LIFE?: NO
1. IN THE PAST MONTH, HAVE YOU WISHED YOU WERE DEAD OR WISHED YOU COULD GO TO SLEEP AND NOT WAKE UP?: NO

## 2025-02-18 NOTE — ED NOTES
Called patient in waiting room, he did not reply. Called spouses's cellphone. Patient was at home taking a nap. Patient is now en route to be admitted.

## 2025-02-18 NOTE — ED TRIAGE NOTES
Patient arrives to ED from urgency room. He was referred here for a diagnosed bowel obstruction. He endorses severe nausea but denies abdominal pain at this time.      Triage Assessment (Adult)       Row Name 02/18/25 1319          Triage Assessment    Airway WDL WDL        Respiratory WDL    Respiratory WDL WDL        Cardiac WDL    Cardiac WDL WDL        Cognitive/Neuro/Behavioral WDL    Cognitive/Neuro/Behavioral WDL WDL

## 2025-02-18 NOTE — H&P
"[facility]    History and Physical - Hospitalist Service       Date of Admission:  2/18/2025    Assessment & Plan      Timur Bullard is a 79 year old male with a history of right partial nephrostomy and SBO admitted on 2/18/2025 for recurrent SBO.     Partial small bowel obstruction:  Presents with nausea vomiting and abdominal bloating for 1 day.  CT scan in outside facility showed partial small bowel obstruction with transition point in the right lower quadrant, distended stomach containing a large amount of fluid and ingested material.   Patient previously had complete bowel obstruction in 2018 and 2024.  Obstruction was thought due to post op adhesion from his nephrectomy. No surgical intervention needed.  - NPO  - IVF  -Recommend NG tube placement. However, patient declined due to previous unpleasant experience.  - Antiemetics  - Gastrografin challenge test when appropriate  - Surgery consult     Elevated total bilirubin  Total bilirubin was 2.8 on admission. His LFTs are normal. CT scan showed diffuse hepatic steatosis. Monitor.      Essential hypertension: Hold PTA amlodipine, metoprolol and verapamil for NPO.  Hydralazine as needed.    Diabetes, type II: Hold PTA metformin.  Insulin sliding scale.      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 Currently on hold.           Diet: NPO for Medical/Clinical Reasons Except for: Meds    DVT Prophylaxis: Enoxaparin (Lovenox) SQ  Polo Catheter: Not present  Lines: None     Cardiac Monitoring: None  Code Status:  Full code    Clinically Significant Risk Factors Present on Admission                   # Hypertension: Noted on problem list           # Overweight: Estimated body mass index is 26.08 kg/m  as calculated from the following:    Height as of this encounter: 1.803 m (5' 11\").    Weight as of this encounter: 84.8 kg (187 lb).              Disposition Plan "     Medically Ready for Discharge: Anticipated in 2-4 Days           Tomer Wade MD  Hospitalist Service  [facility]  Securely message with [employer] (more info)  Text page via [employer] Paging/Directory     ______________________________________________________________________    Chief Complaint   Nausea and vomiting    History is obtained from the patient    History of Present Illness   Timur Bullard is a 79 year old male with history of SBO, right renal cancer s/p partial nephrectomy in the right upper pole in 2015, diabetes, and hypertension who presents with nausea.  Patient reports that yesterday morning at 1 AM, he developed nausea and abdominal bloating. These symptoms woke him up.  After he ate breakfast in the morning, he has been taking fluid only.  However, his nausea and abdominal bloating continue to get worse.  Last night, because of his nausea, he was not able to fall asleep. This morning, he vomited. No abdominal pain. He then went to urgent care for evaluation.  CT scan findings suspicious for a recurrent partial small bowel obstruction.   Patient was referred to [facility] for further management.  Patient reports he continues to have bowel movements.  Since yesterday.  He has had 3 times in total.  He reports no fever no chest pain and no shortness of breath.  No urinary symptoms.,     Past Medical History    Past Medical History:   Diagnosis Date    Hypertension     Type 2 diabetes mellitus with hyperglycemia, without long-term current use of insulin (H) 1/30/2020       Past Surgical History   Past Surgical History:   Procedure Laterality Date    CYSTOSCOPY  12/30/2015    CYSTOSCOPY  11/15/2021    cystolitholapaxy, right stent    CYSTOSCOPY W/ LITHOLAPAXY / EHL N/A 11/15/2021    Procedure: CYSTOSCOPY, REMOVAL OF BLADDER STONES;  Surgeon: Adithya Quinones MD;  Location: [facility] OR    CYSTOURETEROSCOPY, WITH LITHOTRIPSY USING AARTI 120P LASER AND URETERAL STENT  INSERTION Right 11/15/2021    Procedure: RIGHT URETEROSCOPY, WITH HOLMIUM LASER LITHOTRIPSY, STONE EXTRACTION AND DOUBLE J  URETERAL STENT PLACEMENT RIGHT;  Surgeon: Adithya Quinones MD;  [place] [facility] [place]    KIDNEY SURGERY Right        Prior to Admission Medications   Prior to Admission Medications   Prescriptions Last Dose Informant Patient Reported? Taking?   LORazepam (ATIVAN) 1 MG tablet   Yes No   Sig: [LORAZEPAM (ATIVAN) 1 MG TABLET] Take 1 mg by mouth at bedtime as needed for anxiety.   albuterol (PROAIR HFA;PROVENTIL HFA;VENTOLIN HFA) 90 mcg/actuation inhaler   Yes No   Sig: [ALBUTEROL (PROAIR HFA;PROVENTIL HFA;VENTOLIN HFA) 90 MCG/ACTUATION INHALER] Inhale 1-2 puffs every 4 (four) hours as needed for wheezing.   amLODIPine (NORVASC) 5 MG tablet   Yes No   Sig: [AMLODIPINE (NORVASC) 5 MG TABLET] Take 5 mg by mouth at bedtime.   b complex vitamins tablet   Yes No   Sig: [B COMPLEX VITAMINS TABLET] Take 1 tablet by mouth daily.   cholecalciferol, vitamin D3, 1,000 unit tablet   Yes No   Sig: [CHOLECALCIFEROL, VITAMIN D3, 1,000 UNIT TABLET] Take 1,000 Units by mouth 2 (two) times a day.   metFORMIN (GLUCOPHAGE) 500 MG tablet   Yes No   Sig: Take 500 mg by mouth 2 times daily (with meals)   metoprolol succinate ER (TOPROL-XL) 25 MG 24 hr tablet   Yes No   Sig: Take 25 mg by mouth daily   nitroGLYcerin (NITROSTAT) 0.4 MG sublingual tablet   Yes No   Sig: Place 0.4 mg under the tongue every 5 minutes as needed for chest pain For chest pain place 1 tablet under the tongue every 5 minutes for 3 doses. If symptoms persist 5 minutes after 1st dose call 911.   pantoprazole (PROTONIX) 40 MG EC tablet   No No   Sig: Take 1 tablet (40 mg) by mouth every morning (before breakfast)   saccharomyces boulardii (FLORASTOR) 250 MG capsule   No No   Sig: Take 1 capsule (250 mg) by mouth 2 times daily   verapamil ER (VERELAN) 120 MG 24 hr capsule   Yes No   Sig: Take 120 mg by mouth every morning       Facility-Administered Medications: None        Review of Systems    The 10 point Review of Systems is negative other than noted in the HPI or here.      Physical Exam   Vital Signs: Temp: 97.7  F (36.5  C) Temp src: Oral BP: 137/65 Pulse: 87   Resp: 20 SpO2: 98 % O2 Device: None (Room air)    Weight: 187 lbs 0 oz    General appearance: not in acute distress  HEENT: PERRL, EOMI  Lungs: Clear breath sounds in bilateral lung fields  Cardiovascular: Regular rate and rhythm, normal S1-S2  Abdomen: Soft, non tender, distended abdomen  Musculoskeletal: No joint swelling  Skin: No rash and no edema  Neurology: AAO ×3.  Cranial nerves II - XII normal.  Normal muscle strength in all four extremities.     Medical Decision Making       60 MINUTES SPENT BY ME on the date of service doing chart review, history, exam, documentation & further activities per the note.      Data         Imaging results reviewed over the past 24 hrs:   Recent Results (from the past 24 hours)   CT Head w/o Contrast    Narrative    For Patients: As a result of the 21st Century Cures Act, medical imaging exams and procedure reports are released immediately into your electronic medical record. You may view this report before your referring provider. If you have questions, please contact your health care provider.    EXAM: CT HEAD BRAIN WO  LOCATION: [facility] [place]  DATE: 2/18/2025    INDICATION: Headache, new onset (Age >= 51y)  COMPARISON: Brain MRI 2/8/2018, head CT 11/21/2024.  TECHNIQUE: Routine CT Head without IV contrast. Multiplanar reformats. Dose reduction techniques were used.    FINDINGS:  INTRACRANIAL CONTENTS: No intracranial hemorrhage. No CT evidence of acute infarct. Normal parenchymal attenuation. Mild to moderate generalized volume loss. No hydrocephalus. Stable focal widening of a left parietal sulcus, possibly representing focal cortical atrophy or arachnoid cyst. No midline shift or herniation.    VISUALIZED  ORBITS/SINUSES/MASTOIDS: Prior bilateral cataract surgery. Visualized portions of the orbits are otherwise unremarkable. Mild mucosal thickening scattered about the paranasal sinuses. No middle ear or mastoid effusion.    BONES/SOFT TISSUES: No acute abnormality.    Impression    1.  No significant change since 11/21/2024. No acute intracranial process.   CT ABDOMEN PELVIS WO    Narrative    For Patients: As a result of the 21st Century Cures Act, medical imaging exams and procedure reports are released immediately into your electronic medical record. You may view this report before your referring provider. If you have questions, please contact your health care provider.    EXAM: CT ABDOMEN PELVIS WO  LOCATION: [facility] [place]  DATE: 2/18/2025    INDICATION: Abdominal pain, acute, nonlocalized  COMPARISON: CT 7/19/2024  TECHNIQUE: CT scan of the abdomen and pelvis was performed without IV contrast. Multiplanar reformats were obtained. Dose reduction techniques were used.  CONTRAST: None.    FINDINGS:   LOWER CHEST: Calcified granuloma right middle lobe. Linear bands of scarring or atelectasis in the lower lungs. Coronary arterial calcification.    HEPATOBILIARY: Diffuse hepatic steatosis. Calcified hepatic granuloma.    PANCREAS: Normal.    SPLEEN: Normal.    ADRENAL GLANDS: Normal.    KIDNEYS/BLADDER: Postsurgical changes of partial nephrectomy in the right upper pole. Unchanged size of numerous bilateral renal lesions, many of which are high-attenuation and were better characterized on the prior CT renal protocol. Persistent stone in the urinary bladder measuring 6 mm adjacent to the left UVJ. Trabeculated urinary bladder with several diverticula. No hydronephrosis.    BOWEL:  Recurrent dilated loops of small bowel in the anterior abdomen with gradual transition transition point to normal caliber small bowel in the right lower quadrant. The stomach is distended and contains a large amount of fluid  and ingested material. Normal caliber colon. Distal colonic diverticulosis.    LYMPH NODES: Normal.    VASCULATURE: Moderate scattered vascular calcifications.    PELVIC ORGANS: Prostatomegaly with fiducial markers    MUSCULOSKELETAL: Bilateral inguinal hernias each containing some fluid, the appendix on the right, and a colonic diverticula on the left. Small fat-containing umbilical hernia. Multilevel degenerative changes in lumbar spine with grade-1 anterolisthesis of L4 on L5    Impression    1.  Findings suspicious for a recurrent partial small bowel obstruction.  2.  Redemonstrated bilateral renal lesions which were better characterized on the dedicated CT of 7/19/2024.  3.  Diffuse hepatic steatosis.  4.  Distal colonic diverticulosis.

## 2025-02-18 NOTE — MEDICATION SCRIBE - ADMISSION MEDICATION HISTORY
Medication Scribe Admission Medication History    Admission medication history is complete. The information provided in this note is only as accurate as the sources available at the time of the update.    Information Source(s): Patient and CareEverywhere/SureScripts via in-person    Pertinent Information: Patient manages his own medications at home. Reports taking his AM medications only so far today 2/28/25.  Metoprolol succ  -last filled 90 ds 4/30/24. Pt reports cutting in half and taking BID. Takes 12.5 mg BID  Potassium cit 10 meq  -pt reports taking 1 tablet BID, only tab in fill hx is 10 meq. Last filled 90ds 360 tabs 11/29/24.    Changes made to PTA medication list:  Added:   Aspirin 81 mg  Levocetirizine 5 mg QPM  Potassium citrate 1080 mg (10MEQ) BID  Rosuvastatin 20 mg QPM  Deleted:   Ativan  Pantoprazole  Florastor (saccharomyces)  Changed:   Metoprolol succ to 12.5 mg BID    Allergies reviewed with patient and updates made in EHR: yes    Medication History Completed By: Lucio Aguilar 2/18/2025 5:14 PM    PTA Med List   Medication Sig Last Dose/Taking    albuterol (PROAIR HFA;PROVENTIL HFA;VENTOLIN HFA) 90 mcg/actuation inhaler [ALBUTEROL (PROAIR HFA;PROVENTIL HFA;VENTOLIN HFA) 90 MCG/ACTUATION INHALER] Inhale 1-2 puffs every 4 (four) hours as needed for wheezing. Unknown    amLODIPine (NORVASC) 5 MG tablet Take 5 mg by mouth every morning. 2/18/2025 Morning    aspirin 81 MG EC tablet Take 81 mg by mouth every morning. 2/18/2025 Morning    b complex vitamins tablet Take 1 tablet by mouth every morning. 2/18/2025 Morning    cholecalciferol, vitamin D3, 1,000 unit tablet [CHOLECALCIFEROL, VITAMIN D3, 1,000 UNIT TABLET] Take 1,000 Units by mouth 2 (two) times a day. 2/18/2025 Morning    levocetirizine (XYZAL) 5 MG tablet Take 5 mg by mouth every evening. Dust mites allergy 2/17/2025 Evening    metFORMIN (GLUCOPHAGE) 500 MG tablet Take 500 mg by mouth 2 times daily (with meals) 2/18/2025 Morning    metoprolol  succinate ER (TOPROL-XL) 25 MG 24 hr tablet Take 12.5 mg by mouth 2 times daily. 2/18/2025 Morning    nitroGLYcerin (NITROSTAT) 0.4 MG sublingual tablet Place 0.4 mg under the tongue every 5 minutes as needed for chest pain For chest pain place 1 tablet under the tongue every 5 minutes for 3 doses. If symptoms persist 5 minutes after 1st dose call 911. Unknown    potassium citrate (UROCIT-K) 10 MEQ (1080 MG) CR tablet Take 10 mEq by mouth 2 times daily. 2/18/2025 Morning    rosuvastatin (CRESTOR) 20 MG tablet Take 20 mg by mouth every evening. 2/17/2025 Evening    verapamil ER (VERELAN) 120 MG 24 hr capsule Take 120 mg by mouth every morning 2/18/2025 Morning

## 2025-02-18 NOTE — ED PROVIDER NOTES
EMERGENCY DEPARTMENT ENCOUNTER      NAME: Timur Bullard  AGE: 79 year old male  YOB: 1945  MRN: [id number]  EVALUATION DATE & TIME: No admission date for patient encounter.    PCP: Juan Chase    ED PROVIDER: No Zhao MD    Chief Complaint   Patient presents with    Nausea         FINAL IMPRESSION:  1. SBO (small bowel obstruction) (H)          ED COURSE & MEDICAL DECISION MAKING:    Pertinent Labs & Imaging studies reviewed. (See chart for details)  79 year old male with history of previous partial nephrectomy, recurrent SBO, DM who presents to the Emergency Department for evaluation of 3 to 4 days of nausea and abdominal distention similar to previous bowel obstruction.  Seen emergency room prior to arrival with CT evidence of partial bowel obstruction, labs reassuring.  Still remains nauseated after Zofran administered there, does not have any pain at this time.  Symptoms consistent with bowel obstruction.  Suspect that there may be some degree of scar tissues and adhesions from his previous abdominal surgeries.  With ongoing nausea, discussed NG tube patient is agreeable.    Patient initially seen evaluated myself in triage area due to boarding crisis.  Made NPO.  Placed on maintenance fluids.  Given Benadryl, Reglan for ongoing nausea.  Will order Uro-Jet and NG tube placement and admission to hospital for further management.      ED Course as of 02/18/25 1437   Tue Feb 18, 2025   1345 I met with the patient.    1428 Per SOC no beds in system   1436 Admitted to Dr. Murphy       Medical Decision Making  Obtained supplemental history:Supplemental history obtained?:  Wife  Reviewed external records: External records reviewed?:  Emergency room visit today  Care impacted by chronic illness:Diabetes and Hypertension  Did you consider but not order tests?: Work up considered but not performed and documented in chart, if applicable  Did you interpret images independently?: Independent  interpretation of ECG and images noted in documentation, when applicable.  Consultation discussion with other provider:Did you involve another provider (consultant, , pharmacy, etc.)?: I discussed the care with another health care provider, see documentation for details.  Admit.    MIPS: Not Applicable      At the conclusion of the encounter I discussed the results of all of the tests and the disposition. The questions were answered. The patient or family acknowledged understanding and was agreeable with the care plan.        MEDICATIONS GIVEN IN THE EMERGENCY:  Medications   lidocaine (XYLOCAINE) 2 % external gel 10 mL (has no administration in time range)   metoclopramide (REGLAN) injection 10 mg (has no administration in time range)   diphenhydrAMINE (BENADRYL) injection 25 mg (has no administration in time range)   sodium chloride 0.9 % infusion (has no administration in time range)       NEW PRESCRIPTIONS STARTED AT TODAY'S ER VISIT  New Prescriptions    No medications on file          =================================================================    HPI    Patient information was obtained from: patient     Use of Intrepreter: N/A     Timur Bullard is a 79 year old male with pertinent medical history of SBO, diabetes, and hypertension who presents with nausea.     Patient stated that he was seen at urgency room and was diagnosed with a bowl obstruction. This is his third bowl obstruction. He started to get nauseous on Monday and has had one episode of vomiting. When his symptoms started, he switched to more of a liquid diet. He is still having bowl movements. He does feel distended.    PAST MEDICAL HISTORY:  Past Medical History:   Diagnosis Date    Hypertension     Type 2 diabetes mellitus with hyperglycemia, without long-term current use of insulin (H) 1/30/2020       PAST SURGICAL HISTORY:  Past Surgical History:   Procedure Laterality Date    CYSTOSCOPY  12/30/2015    CYSTOSCOPY  11/15/2021     "cystolitholapaxy, right stent    CYSTOSCOPY W/ LITHOLAPAXY / EHL N/A 11/15/2021    Procedure: CYSTOSCOPY, REMOVAL OF BLADDER STONES;  Surgeon: Adithya Quinones MD;  Location: [facility] OR    CYSTOURETEROSCOPY, WITH LITHOTRIPSY USING AARTI 120P LASER AND URETERAL STENT INSERTION Right 11/15/2021    Procedure: RIGHT URETEROSCOPY, WITH HOLMIUM LASER LITHOTRIPSY, STONE EXTRACTION AND DOUBLE J  URETERAL STENT PLACEMENT RIGHT;  Surgeon: Adithya Quinones MD;  Location: [facility] OR    KIDNEY SURGERY Right        CURRENT MEDICATIONS:    Cannot display prior to admission medications because the patient has not been admitted in this contact.       ALLERGIES:  Allergies   Allergen Reactions    Ciprofloxacin Other (See Comments)     \"I get loopy\"    Gadolinium-Containing Contrast Media [Gadolinium Derivatives] Hives    Percocet [Oxycodone-Acetaminophen] Other (See Comments)     Delusional        FAMILY HISTORY:  Family History   Problem Relation Age of Onset    Heart Disease Father        SOCIAL HISTORY:  Social History     Tobacco Use    Smoking status: Former     Current packs/day: 0.00     Types: Cigarettes     Quit date: 1978     Years since quittin.1    Smokeless tobacco: Never   Vaping Use    Vaping status: Never Used   Substance Use Topics    Alcohol use: Not Currently     Comment: Alcoholic Drinks/day: Occasionally    Drug use: No        VITALS:  Patient Vitals for the past 24 hrs:   BP Temp Temp src Pulse Resp SpO2 Height Weight   25 1318 137/65 97.7  F (36.5  C) Oral 87 20 98 % 1.803 m (5' 11\") 84.8 kg (187 lb)       PHYSICAL EXAM    General Appearance: Well-appearing, well-nourished, no acute distress   Cardio:  Regular rate and rhythm  Pulm:  No respiratory distress  Abdomen:  Soft, non-tender, distended no rebound or guarding.  Extremities: Normal gait  Skin:  Skin warm, dry, no rashes  Neuro:  Alert and oriented ×3     RADIOLOGY/LABS:  Reviewed all pertinent imaging. Please see " official radiology report. All pertinent labs reviewed and interpreted.         The creation of this record is based on the scribe s observations of the work being performed by No Zhao MD and the provider s statements to them. It was created on her behalf by Jorge Pugh, a trained medical scribe. This document has been checked and approved by the attending provider.    No Zhao MD  Emergency Medicine  [facility] EMERGENCY DEPARTMENT  [address] [place] [address] [phone]  Dept: [phone]       No Zhao MD  02/18/25 1291

## 2025-02-19 ENCOUNTER — APPOINTMENT (OUTPATIENT)
Dept: RADIOLOGY | Facility: HOSPITAL | Age: 80
DRG: 390 | End: 2025-02-19
Attending: SPECIALIST
Payer: COMMERCIAL

## 2025-02-19 LAB
GLUCOSE BLDC GLUCOMTR-MCNC: 116 MG/DL (ref 70–99)
GLUCOSE BLDC GLUCOMTR-MCNC: 118 MG/DL (ref 70–99)
GLUCOSE BLDC GLUCOMTR-MCNC: 120 MG/DL (ref 70–99)
GLUCOSE BLDC GLUCOMTR-MCNC: 129 MG/DL (ref 70–99)
GLUCOSE BLDC GLUCOMTR-MCNC: 140 MG/DL (ref 70–99)
GLUCOSE BLDC GLUCOMTR-MCNC: 163 MG/DL (ref 70–99)
GLUCOSE BLDC GLUCOMTR-MCNC: 197 MG/DL (ref 70–99)

## 2025-02-19 PROCEDURE — 99232 SBSQ HOSP IP/OBS MODERATE 35: CPT | Performed by: INTERNAL MEDICINE

## 2025-02-19 PROCEDURE — 258N000003 HC RX IP 258 OP 636: Performed by: INTERNAL MEDICINE

## 2025-02-19 PROCEDURE — 250N000013 HC RX MED GY IP 250 OP 250 PS 637: Performed by: INTERNAL MEDICINE

## 2025-02-19 PROCEDURE — 74018 RADEX ABDOMEN 1 VIEW: CPT

## 2025-02-19 PROCEDURE — 82962 GLUCOSE BLOOD TEST: CPT

## 2025-02-19 PROCEDURE — 120N000001 HC R&B MED SURG/OB

## 2025-02-19 PROCEDURE — 99221 1ST HOSP IP/OBS SF/LOW 40: CPT | Performed by: SPECIALIST

## 2025-02-19 PROCEDURE — 250N000009 HC RX 250: Performed by: SPECIALIST

## 2025-02-19 PROCEDURE — 250N000011 HC RX IP 250 OP 636: Performed by: INTERNAL MEDICINE

## 2025-02-19 PROCEDURE — 255N000002 HC RX 255 OP 636: Performed by: SPECIALIST

## 2025-02-19 RX ORDER — AMLODIPINE BESYLATE 5 MG/1
5 TABLET ORAL EVERY MORNING
Status: DISCONTINUED | OUTPATIENT
Start: 2025-02-20 | End: 2025-02-20 | Stop reason: HOSPADM

## 2025-02-19 RX ORDER — VERAPAMIL HYDROCHLORIDE 120 MG/1
120 TABLET, FILM COATED, EXTENDED RELEASE ORAL EVERY MORNING
Status: DISCONTINUED | OUTPATIENT
Start: 2025-02-20 | End: 2025-02-20 | Stop reason: HOSPADM

## 2025-02-19 RX ADMIN — ENOXAPARIN SODIUM 40 MG: 40 INJECTION SUBCUTANEOUS at 21:09

## 2025-02-19 RX ADMIN — DEXTROSE AND SODIUM CHLORIDE: 5; 900 INJECTION, SOLUTION INTRAVENOUS at 21:59

## 2025-02-19 RX ADMIN — METOPROLOL SUCCINATE 12.5 MG: 25 TABLET, FILM COATED, EXTENDED RELEASE ORAL at 22:47

## 2025-02-19 RX ADMIN — WATER 150 ML: 100 IRRIGANT IRRIGATION at 08:26

## 2025-02-19 RX ADMIN — DEXTROSE AND SODIUM CHLORIDE: 5; 900 INJECTION, SOLUTION INTRAVENOUS at 08:32

## 2025-02-19 ASSESSMENT — ACTIVITIES OF DAILY LIVING (ADL)
ADLS_ACUITY_SCORE: 52
ADLS_ACUITY_SCORE: 33
ADLS_ACUITY_SCORE: 52
ADLS_ACUITY_SCORE: 33
ADLS_ACUITY_SCORE: 52
ADLS_ACUITY_SCORE: 33
ADLS_ACUITY_SCORE: 29
ADLS_ACUITY_SCORE: 29
ADLS_ACUITY_SCORE: 33
ADLS_ACUITY_SCORE: 52
ADLS_ACUITY_SCORE: 52
ADLS_ACUITY_SCORE: 33
ADLS_ACUITY_SCORE: 33
ADLS_ACUITY_SCORE: 52
ADLS_ACUITY_SCORE: 33
ADLS_ACUITY_SCORE: 52
ADLS_ACUITY_SCORE: 52

## 2025-02-19 NOTE — SUMMARY OF CARE
Patient admitted to room 08 at approximately 14:19PM   via wheel chair from emergency room.    Patient ambulated/transferred:  independently. self.    Detailed List of Belongings (be very specific listing out each item):     Glasses, coat, clothes, shoes, phone,

## 2025-02-19 NOTE — PROGRESS NOTES
Patient admitted to room 8 at approximately 1415 via wheel chair from emergency room.  Reason for Admission: SBO  Report received from: Julissa PRITCHETT RN  Patient was accompanied by Self.  Discharge transportation provided by:  Patient ambulated/transferred:  independently. self.  Patient is alert and orientated x 3.  Safety risks were identified during admission:  none.   Yellow risk/fall band applied:  No  Home meds sent home: Yes  Home meds sent to pharmacy:Yes IF YES add 1/2 sheet laminated page reminder to chart/clipboard   Detailed Belongings: clothing, shoes, fan, duffel bag, coat, wallet w/ 1 debit card and no cash, phone, , robitussin, and glasses.

## 2025-02-19 NOTE — PLAN OF CARE
Problem: Comorbidity Management  Goal: Blood Glucose Levels Within Targeted Range  Outcome: Progressing     Problem: Comorbidity Management  Goal: Blood Pressure in Desired Range  Outcome: Progressing     Problem: Intestinal Obstruction  Goal: Fluid and Electrolyte Balance  Outcome: Progressing     Problem: Intestinal Obstruction  Goal: Optimal Pain Control and Function  Outcome: Progressing   Goal Outcome Evaluation:    Pt slept btw cares. VSS on room air. Denies pain. Denies nausea. NPO. Surgery consult. Iv fluids running continuous. BG Q4 with S/S insulin. Up independently in room.

## 2025-02-19 NOTE — CONSULTS
Consultation - Surgery  Timur Bullard,  1945, MRN [id number]    Admitting Dx: SBO (small bowel obstruction) (H) [K56.609]    PCP: Juan Chase, [phone]   Code status:  Full Code       Extended Emergency Contact Information  Primary Emergency Contact: France Bullard  Address: [address] [place] [address]  Mobile Phone: [phone]  Relation: Spouse  Secondary Emergency Contact: Julia Tripathi   [place]  Mobile Phone: [phone]  Relation: Other       Assessment and Plan   Impression:  Probable gastroenteritis as his SBO has already resolved.  He is having several stools.  He already however drank his contrast for the Gastrografin challenge.      Plan:  Get a follow-up x-ray but I am sure it will show no evidence of obstruction.  At that point he can start clear liquids and advance as tolerated and potentially even be discharged home later today.           Chief Complaint SBO (small bowel obstruction) (H)       HPI    We have been requested by hospitalist service to evaluate Timur Bullard for possible small bowel obstruction.  This is a 79 year old year old male who was admitted with nausea vomiting and abdominal pain.  CT scan showed evidence of a possible low-grade obstruction.  He has had this once before that resolved on its own.  Since admission drinking Gastrografin he has already had 2 bowel movements and feels like he needs to have another 1.       Medical History  Patient Active Problem List   Diagnosis    USMAN (acute kidney injury)    Left ureteral stone    Essential hypertension    Alcohol use    Ureteral calculus, left    Type 2 diabetes mellitus with hyperglycemia, without long-term current use of insulin (H)    SBO (small bowel obstruction) (H)    Hepatic steatosis    Type 2 diabetes mellitus without complication, without long-term current use of insulin (H)    Hyperlipidemia    Coronary artery disease involving native coronary artery of  "native heart without angina pectoris    Premature ventricular contraction       [unfilled] Surgical History  Past Surgical History:   Procedure Laterality Date    CYSTOSCOPY  2015    CYSTOSCOPY  11/15/2021    cystolitholapaxy, right stent    CYSTOSCOPY W/ LITHOLAPAXY / EHL N/A 11/15/2021    Procedure: CYSTOSCOPY, REMOVAL OF BLADDER STONES;  Surgeon: Adithya Quinones MD;  Location: [facility]    CYSTOURETEROSCOPY, WITH LITHOTRIPSY USING AARTI 120P LASER AND URETERAL STENT INSERTION Right 11/15/2021    Procedure: RIGHT URETEROSCOPY, WITH HOLMIUM LASER LITHOTRIPSY, STONE EXTRACTION AND DOUBLE J  URETERAL STENT PLACEMENT RIGHT;  Surgeon: Adithya Quinones MD;  Location: [facility] OR    KIDNEY SURGERY Right         Social History  Social History     Tobacco Use    Smoking status: Former     Current packs/day: 0.00     Types: Cigarettes     Quit date: 1978     Years since quittin.1    Smokeless tobacco: Never   Vaping Use    Vaping status: Never Used   Substance Use Topics    Alcohol use: Not Currently     Comment: Alcoholic Drinks/day: Occasionally    Drug use: No       Allergies  Allergies   Allergen Reactions    Ciprofloxacin Other (See Comments)     \"I get loopy\"    Gadolinium-Containing Contrast Media [Gadolinium Derivatives] Hives    Percocet [Oxycodone-Acetaminophen] Other (See Comments)     Delusional     Family History  Reviewed, and family history includes Heart Disease in his father.  The Family history is not pertinent to the patients chief complaint. Psychosocial Needs  Social History     Social History Narrative    Not on file     Additional psychosocial needs reviewed per nursing assessment.     Prior to Admission Medications   Current Outpatient Medications   Medication Instructions    albuterol (PROAIR HFA;PROVENTIL HFA;VENTOLIN HFA) 90 mcg/actuation inhaler 1-2 puffs, EVERY 4 HOURS PRN    amLODIPine (NORVASC) 5 mg, EVERY MORNING    aspirin 81 mg, EVERY MORNING    b " complex vitamins tablet 1 tablet, EVERY MORNING    levocetirizine (XYZAL) 5 mg, EVERY EVENING    metFORMIN (GLUCOPHAGE) 500 mg, 2 TIMES DAILY WITH MEALS    metoprolol succinate ER (TOPROL XL) 12.5 mg, 2 TIMES DAILY    nitroGLYcerin (NITROSTAT) 0.4 mg, EVERY 5 MIN PRN    potassium citrate (UROCIT-K) 10 MEQ (1080 MG) CR tablet 10 mEq, 2 TIMES DAILY    rosuvastatin (CRESTOR) 20 mg, EVERY EVENING    verapamil ER (VERELAN) 120 mg, EVERY MORNING    Vitamin D3 (CHOLECALCIFEROL) 1,000 Units, 2 TIMES DAILY           Review of Systems:  Pertinent items are noted in HPI. Physical Exam:  Temp:  [97.7  F (36.5  C)-98.6  F (37  C)] 98.2  F (36.8  C)  Pulse:  [62-89] 62  Resp:  [18-20] 20  BP: (106-151)/(55-80) 135/60  SpO2:  [98 %-99 %] 99 %    General appearance: alert, appears stated age, and cooperative  Eyes: no abnormalities detected  Lungs: No shortness of breath  Heart: Regular.  Not tachycardic  Abdomen: Soft and nontender  Extremities: Warm and well-perfused  Pulses: 2+ and symmetric  Skin: Skin color, texture, turgor normal. No rashes or lesions  Neurologic: Grossly normal       Pertinent Labs  Lab Results: personally reviewed.   Lab Results   Component Value Date    WBC 7.7 07/20/2024    HGB 15.1 07/20/2024    HCT 43.2 07/20/2024    MCV 81 07/20/2024    PLT 65 02/18/2025        Pertinent Radiology  Radiology Results: Personally reviewed image/s and Personally reviewed impression/s  EKG Results: not reviewed.

## 2025-02-19 NOTE — CONSULTS
General Surgery Consultation  Timur Bullard MRN# [id number]   Age/Sex: 79 year old male YOB: 1945     Reason for consult: 1. SBO (small bowel obstruction) (H)            Requesting physician: Alana Murphy MD                    Assessment and Plan:   Assessment:  Timur Bullard is a 79 year old male with history of T2DM, SBO, kidney cancer s/p partial right nephrectomy who presents with 2 days of nausea and abdominal distention. CT scan shows a recurrent partial small bowel obstruction. Afebrile and no tachycardia. Abdomen is soft, non-distended, and non-tender on exam. Denies current nausea or vomiting. Is passing gas but no BMs today. Patient would like to avoid NGT. Reassuring he is starting to pass gas, is non-distended on exam and with no current nausea. Plan for conservative management of pSBO with PO Gastrografin challenge.    Plan:  - NPO for bowel rest  - PO GGC  - Encourage activity and ambulation to promote bowel function  - Medical management per primary team  - General surgery will continue to follow          Chief Complaint:     Chief Complaint   Patient presents with    Nausea        History is obtained from the patient and chart review.    HPI:   Timur Bullard is a 79 year old male with history of T2DM, SBO, kidney cancer s/p partial right nephrectomy who presents with 2 days of nausea and abdominal distention. On Sunday he started to have a little bit of nausea which persisted over the next day. Had 1 emesis which was non-bloody. Also was feeling quite bloated during this time. Was still passing gas and had 3 BMs the day prior to coming to the ED which was soft and brown. States he has had episodes of similar symptoms in the past with 2 SBO's. Tried to stick with a liquid diet during symptom onset but due to persistent nausea went to urgent care who did imaging which prompted him to get evaluated in the ED. Denies changes in bladder function, bloody or watery stools, fever,  "chills. Since coming to the ED, he is now passing gas but no BMs today. Denies any current nausea or vomiting. Also feels less distended.    Previous send surgery history includes a partial right nephrectomy. Last followed up with his nephrologist 2 weeks ago. Takes aspirin daily.         Past Medical History:     Past Medical History:   Diagnosis Date    Coronary artery disease involving native coronary artery of native heart without angina pectoris 2/18/2025    Hypertension     Type 2 diabetes mellitus with hyperglycemia, without long-term current use of insulin (H) 1/30/2020              Past Surgical History:     Past Surgical History:   Procedure Laterality Date    CYSTOSCOPY  12/30/2015    CYSTOSCOPY  11/15/2021    cystolitholapaxy, right stent    CYSTOSCOPY W/ LITHOLAPAXY / EHL N/A 11/15/2021    Procedure: CYSTOSCOPY, REMOVAL OF BLADDER STONES;  Surgeon: Adithya Quinones MD;  Location: [facility] [place]    CYSTOURETEROSCOPY, WITH LITHOTRIPSY USING AARTI 120P LASER AND URETERAL STENT INSERTION Right 11/15/2021    Procedure: RIGHT URETEROSCOPY, WITH HOLMIUM LASER LITHOTRIPSY, STONE EXTRACTION AND DOUBLE J  URETERAL STENT PLACEMENT RIGHT;  Surgeon: Adithya Quinones MD;  Location: [facility] [place] OR    KIDNEY SURGERY Right              Social History:    reports that he quit smoking about 47 years ago. He has never used smokeless tobacco. He reports that he does not currently use alcohol. He reports that he does not use drugs.           Family History:     Family History   Problem Relation Age of Onset    Heart Disease Father               Allergies:     Allergies   Allergen Reactions    Ciprofloxacin Other (See Comments)     \"I get loopy\"    Gadolinium-Containing Contrast Media [Gadolinium Derivatives] Hives    Percocet [Oxycodone-Acetaminophen] Other (See Comments)     Delusional               Medications:     Prior to Admission medications    Medication Sig Start Date End Date Taking? Authorizing " Provider   albuterol (PROAIR HFA;PROVENTIL HFA;VENTOLIN HFA) 90 mcg/actuation inhaler [ALBUTEROL (PROAIR HFA;PROVENTIL HFA;VENTOLIN HFA) 90 MCG/ACTUATION INHALER] Inhale 1-2 puffs every 4 (four) hours as needed for wheezing. 1/27/20  Yes Provider, Historical   amLODIPine (NORVASC) 5 MG tablet Take 5 mg by mouth every morning. 1/27/20  Yes Provider, Historical   aspirin 81 MG EC tablet Take 81 mg by mouth every morning.   Yes Reported, Patient   b complex vitamins tablet Take 1 tablet by mouth every morning. 12/30/15  Yes Provider, Historical   cholecalciferol, vitamin D3, 1,000 unit tablet [CHOLECALCIFEROL, VITAMIN D3, 1,000 UNIT TABLET] Take 1,000 Units by mouth 2 (two) times a day. 12/30/15  Yes Provider, Historical   levocetirizine (XYZAL) 5 MG tablet Take 5 mg by mouth every evening. Dust mites allergy   Yes Reported, Patient   metFORMIN (GLUCOPHAGE) 500 MG tablet Take 500 mg by mouth 2 times daily (with meals)   Yes Reported, Patient   metoprolol succinate ER (TOPROL-XL) 25 MG 24 hr tablet Take 12.5 mg by mouth 2 times daily.   Yes Reported, Patient   nitroGLYcerin (NITROSTAT) 0.4 MG sublingual tablet Place 0.4 mg under the tongue every 5 minutes as needed for chest pain For chest pain place 1 tablet under the tongue every 5 minutes for 3 doses. If symptoms persist 5 minutes after 1st dose call 911.   Yes Unknown, Entered By History   potassium citrate (UROCIT-K) 10 MEQ (1080 MG) CR tablet Take 10 mEq by mouth 2 times daily.   Yes Reported, Patient   rosuvastatin (CRESTOR) 20 MG tablet Take 20 mg by mouth every evening.   Yes Reported, Patient   verapamil ER (VERELAN) 120 MG 24 hr capsule Take 120 mg by mouth every morning   Yes Unknown, Entered By History              Review of Systems:   The Review of Systems is negative other than noted in the HPI          Physical Exam:   Patient Vitals for the past 24 hrs:   BP Temp Temp src Pulse Resp SpO2 Height Weight   02/19/25 0721 135/60 98.2  F (36.8  C) Oral 62 20  "99 % -- --   02/19/25 0400 106/55 -- -- -- -- -- -- --   02/18/25 2345 (!) 151/80 98.1  F (36.7  C) Oral 85 20 98 % -- --   02/18/25 1900 125/72 98.6  F (37  C) Oral 89 18 99 % -- --   02/18/25 1318 137/65 97.7  F (36.5  C) Oral 87 20 98 % 1.803 m (5' 11\") 84.8 kg (187 lb)          Intake/Output Summary (Last 24 hours) at 2/19/2025 0950  Last data filed at 2/19/2025 0522  Gross per 24 hour   Intake 785 ml   Output --   Net 785 ml      Constitutional:   Awake, alert, cooperative, no apparent distress, and appears stated age     Eyes:   PERRL, conjunctiva/corneas clear, EOM's intact; no scleral edema or icterus noted      ENT:   Normocephalic, without obvious abnormality, atraumatic, Lips, mucosa, and tongue normal      Lungs:   Normal respiratory effort, no accessory muscle use     Cardiovascular:   Regular rate and rhythm     Abdomen:   Soft, non-distended, non-tender. No rebound tenderness or guarding.     Musculoskeletal:   No obvious swelling, bruising or deformity     Skin:   Skin color and texture normal for patient, no rashes or lesions              Data:         All imaging studies reviewed by me.    Results for orders placed or performed during the hospital encounter of 02/18/25 (from the past 24 hours)   Glucose by meter   Result Value Ref Range    GLUCOSE BY METER POCT 92 70 - 99 mg/dL   CT External Imaging Abdomen    Narrative    Images were obtained from an external facility.  Click PACS Images   hyperlink to view images.  Textual results have been scanned into the   media tab.   Platelet count   Result Value Ref Range    Platelet Count 65 (L) 150 - 450 10e3/uL   Creatinine   Result Value Ref Range    Creatinine 0.99 0.67 - 1.17 mg/dL    GFR Estimate 78 >60 mL/min/1.73m2   Glucose by meter   Result Value Ref Range    GLUCOSE BY METER POCT 140 (H) 70 - 99 mg/dL   Glucose by meter   Result Value Ref Range    GLUCOSE BY METER POCT 129 (H) 70 - 99 mg/dL   Glucose by meter   Result Value Ref Range    GLUCOSE " BY METER POCT 118 (H) 70 - 99 mg/dL           Nehal Cormier PA-C  [facility]  [address]  [place] [address]   [facility] [phone]

## 2025-02-19 NOTE — PLAN OF CARE
Pt A/Ox4, denies abdominal discomfort, nausea, and vomiting at this time. Abd soft, round, and non distended. Reports x6 BM since finishing contrast for gastrografin challenge. Plan for xray this evening between 2294-3806 per ED RN. Currently NPO and D5 1/2 NS at 75mL/hr. Up independently in room.    Problem: Adult Inpatient Plan of Care  Goal: Optimal Comfort and Wellbeing  Outcome: Progressing     Problem: Intestinal Obstruction  Goal: Fluid and Electrolyte Balance  Outcome: Progressing     Problem: Intestinal Obstruction  Goal: Optimal Bowel Function  Outcome: Progressing

## 2025-02-19 NOTE — PROGRESS NOTES
[facility]    Medicine Progress Note - Hospitalist Service    Date of Admission:  2/18/2025    Assessment & Plan      Timur Bullard is a 79 year old male with a history of right partial nephrostomy and SBO admitted on 2/18/2025 for recurrent SBO.     Partial small bowel obstruction:  Presents with nausea vomiting and abdominal bloating for 1 day.  CT scan in outside facility showed partial small bowel obstruction with transition point in the right lower quadrant, distended stomach containing a large amount of fluid and ingested material.   Patient previously had complete bowel obstruction in 2018 and 2024.  Obstruction was thought due to post op adhesion from his nephrectomy. No surgical intervention needed.  Gastrografin challenge test today shows SBO resolved.   - Advance diet as tolerated. Clear liquid diet today.   - Antiemetics prn  - Surgery consulted and input appreciated.     Elevated total bilirubin  Total bilirubin was 2.8 on admission. His LFTs are normal. CT scan showed diffuse hepatic steatosis. Monitor.      Essential hypertension: Resume PTA amlodipine, metoprolol and verapamil. Hydralazine as needed.    Diabetes, type II: Hold PTA metformin.  Insulin sliding scale.      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 Currently on hold.           Diet: NPO for Medical/Clinical Reasons Except for: No Exceptions    DVT Prophylaxis: Enoxaparin (Lovenox) SQ  Polo Catheter: Not present  Lines: None     Cardiac Monitoring: None  Code Status: Full Code      Clinically Significant Risk Factors Present on Admission                 # Drug Induced Platelet Defect: home medication list includes an antiplatelet medication   # Hypertension: Noted on problem list                      Social Drivers of Health    Tobacco Use: Medium Risk (2/18/2025)    Patient History     Smoking Tobacco Use: Former     Smokeless Tobacco  Use: Never          Disposition Plan     Medically Ready for Discharge: Anticipated Tomorrow             Tomer Wade MD  Hospitalist Service  [facility]  Securely message with [employer] (more info)  Text page via [employer] Paging/Directory   ______________________________________________________________________    Interval History   Overnight, patient feels that his nausea improved. He drank the gastrografin and started to have multiple bowel movement. His abdominal  distension resolved.     Physical Exam   Vital Signs: Temp: 98.2  F (36.8  C) Temp src: Oral BP: 134/71 Pulse: 73   Resp: 18 SpO2: 96 % O2 Device: None (Room air)    Weight: 175 lbs 11.31 oz    General appearance: not in acute distress  HEENT: PERRL, EOMI  Lungs: Clear breath sounds in bilateral lung fields  Cardiovascular: Regular rate and rhythm, normal S1-S2  Abdomen: Soft, non tender, no distension  Musculoskeletal: No joint swelling  Skin: No rash and no edema  Neurology: AAO ×3.  Cranial nerves II - XII normal.  Normal muscle strength in all four extremities.     Medical Decision Making       45 MINUTES SPENT BY ME on the date of service doing chart review, history, exam, documentation & further activities per the note.      Data         Imaging results reviewed over the past 24 hrs:   Recent Results (from the past 24 hours)   XR Gastrografin Challenge    Narrative    EXAM: XR GASTROGRAFIN CHALLENGE  LOCATION: [facility]  DATE: 2/19/2025    INDICATION: Small Bowel Obstruction  COMPARISON: CT 2/18/2025  TECHNIQUE: Routine water soluble contrast follow-through challenge.      Impression    IMPRESSION:  1.  Water soluble contrast material IS identified within the colon 8 hours post administration.  2.  Interval decrease in distention of central small bowel loops in comparison to most recent CT.

## 2025-02-20 VITALS
HEIGHT: 71 IN | DIASTOLIC BLOOD PRESSURE: 75 MMHG | BODY MASS INDEX: 24.6 KG/M2 | OXYGEN SATURATION: 98 % | SYSTOLIC BLOOD PRESSURE: 160 MMHG | WEIGHT: 175.71 LBS | RESPIRATION RATE: 18 BRPM | HEART RATE: 72 BPM | TEMPERATURE: 98 F

## 2025-02-20 LAB
GLUCOSE BLDC GLUCOMTR-MCNC: 100 MG/DL (ref 70–99)
GLUCOSE BLDC GLUCOMTR-MCNC: 91 MG/DL (ref 70–99)

## 2025-02-20 PROCEDURE — 99239 HOSP IP/OBS DSCHRG MGMT >30: CPT | Performed by: EMERGENCY MEDICINE

## 2025-02-20 PROCEDURE — 99231 SBSQ HOSP IP/OBS SF/LOW 25: CPT | Performed by: SPECIALIST

## 2025-02-20 PROCEDURE — 250N000013 HC RX MED GY IP 250 OP 250 PS 637: Performed by: INTERNAL MEDICINE

## 2025-02-20 RX ADMIN — AMLODIPINE BESYLATE 5 MG: 5 TABLET ORAL at 08:16

## 2025-02-20 RX ADMIN — VERAPAMIL HYDROCHLORIDE 120 MG: 120 TABLET ORAL at 08:16

## 2025-02-20 RX ADMIN — METOPROLOL SUCCINATE 12.5 MG: 25 TABLET, FILM COATED, EXTENDED RELEASE ORAL at 08:16

## 2025-02-20 ASSESSMENT — ACTIVITIES OF DAILY LIVING (ADL)
ADLS_ACUITY_SCORE: 33

## 2025-02-20 NOTE — PROGRESS NOTES
Patient discharged to home at 1015 via Private Car.  Accompanied by spouse and staff.  Discharge instructions were reviewed with patient, opportunity offered to ask questions.    Prescriptions printed and given to patient/family.  Access discontinued: Yes  Care plan and education discontinued: Yes  Home meds retrieved from pharmacy: Yes  Belongings were sent home with patient/family: glasses, jacket, shoes, phone &  wallet with 1 debit card, duffel bag, fan, robitussin.

## 2025-02-20 NOTE — DISCHARGE SUMMARY
[facility] MEDICINE  DISCHARGE SUMMARY     Primary Care Physician: Juan Chase  Admission Date: 2/18/2025   Discharge Provider: Dakota Perez MD Discharge Date: 2/20/2025   Diet:   Active Diet and Nourishment Order   Procedures    Regular Diet Adult    Diet       Code Status: Full Code   Activity: DCACTIVITY: Activity as tolerated        Condition at Discharge: Good     REASON FOR PRESENTATION(See Admission Note for Details)     SBO    PRINCIPAL & ACTIVE DISCHARGE DIAGNOSES     Principal Problem:    SBO (small bowel obstruction) (H)  Active Problems:    Essential hypertension    Hepatic steatosis    Type 2 diabetes mellitus without complication, without long-term current use of insulin (H)    Hyperlipidemia    Coronary artery disease involving native coronary artery of native heart without angina pectoris    Premature ventricular contraction      PENDING LABS     Unresulted Labs Ordered in the Past 30 Days of this Admission       No orders found from 1/19/2025 to 2/19/2025.              PROCEDURES ( this hospitalization only)          RECOMMENDATIONS TO OUTPATIENT PROVIDER FOR F/U VISIT     Follow-up Appointments       Hospital Follow-up with Existing Primary Care Provider (PCP)      Please see details below         Schedule Primary Care visit within: 14 Days             Recheck platelets.      DISPOSITION     Home    SUMMARY OF HOSPITAL COURSE:      Timur is a 71-year-old male admitted for recurrent SBO which has resolved.  He has no further abdominal pain, he has tolerated regular diet.  He is passing flatus and has had multiple stools.  Cleared for discharge by surgery and on my evaluation he is medically stable.  His chronic medical conditions are also stable.  Of note his platelets on February 18 dropped into the 60s, no follow-up was ordered in the hospital and he is showing no evidence of bleeding.  I have asked him to have this rechecked in his  clinic.    Discharge Medications with Med changes:     Current Discharge Medication List        CONTINUE these medications which have NOT CHANGED    Details   albuterol (PROAIR HFA;PROVENTIL HFA;VENTOLIN HFA) 90 mcg/actuation inhaler [ALBUTEROL (PROAIR HFA;PROVENTIL HFA;VENTOLIN HFA) 90 MCG/ACTUATION INHALER] Inhale 1-2 puffs every 4 (four) hours as needed for wheezing.    Comments: May substitute the equivalent medication per insurance preference.      amLODIPine (NORVASC) 5 MG tablet Take 5 mg by mouth every morning.      aspirin 81 MG EC tablet Take 81 mg by mouth every morning.      b complex vitamins tablet Take 1 tablet by mouth every morning.      cholecalciferol, vitamin D3, 1,000 unit tablet [CHOLECALCIFEROL, VITAMIN D3, 1,000 UNIT TABLET] Take 1,000 Units by mouth 2 (two) times a day.      levocetirizine (XYZAL) 5 MG tablet Take 5 mg by mouth every evening. Dust mites allergy      metFORMIN (GLUCOPHAGE) 500 MG tablet Take 500 mg by mouth 2 times daily (with meals)      metoprolol succinate ER (TOPROL-XL) 25 MG 24 hr tablet Take 12.5 mg by mouth 2 times daily.      nitroGLYcerin (NITROSTAT) 0.4 MG sublingual tablet Place 0.4 mg under the tongue every 5 minutes as needed for chest pain For chest pain place 1 tablet under the tongue every 5 minutes for 3 doses. If symptoms persist 5 minutes after 1st dose call 911.      potassium citrate (UROCIT-K) 10 MEQ (1080 MG) CR tablet Take 10 mEq by mouth 2 times daily.      rosuvastatin (CRESTOR) 20 MG tablet Take 20 mg by mouth every evening.      verapamil ER (VERELAN) 120 MG 24 hr capsule Take 120 mg by mouth every morning                   Rationale for medication changes:              Consults       SURGERY GENERAL IP CONSULT    Immunizations given this encounter     Most Recent Immunizations   Administered Date(s) Administered    COVID-19 12+ (2023-24) (NOVAVAX) 01/02/2024    COVID-19 Vaccine (Je) 12/20/2021    INFLUENZA,TRIVALENT (FLUCELVAX) 11/04/2019     "Influenza (High Dose) Trivalent,PF (Fluzone) 11/04/2019           Anticoagulation Information      Recent INR results: No results for input(s): \"INR\" in the last 168 hours.  Warfarin doses (if applicable) or name of other anticoagulant:       SIGNIFICANT IMAGING FINDINGS     Results for orders placed or performed during the hospital encounter of 02/18/25   XR Gastrografin Challenge    Impression    IMPRESSION:  1.  Water soluble contrast material IS identified within the colon 8 hours post administration.  2.  Interval decrease in distention of central small bowel loops in comparison to most recent CT.       SIGNIFICANT LABORATORY FINDINGS     Most Recent 3 CBC's:  Recent Labs   Lab Test 02/18/25  2043 07/20/24  0619 07/19/24  0653 01/28/20  0623   WBC  --  7.7 9.6 10.3   HGB  --  15.1 14.2 12.4*   MCV  --  81 83 84   PLT 65*  --   --  161           Discharge Orders        Reason for your hospital stay    SBO     Activity    Your activity upon discharge: activity as tolerated     Diet    Follow this diet upon discharge: Current Diet:Orders Placed This Encounter      Regular Diet Adult     Hospital Follow-up with Existing Primary Care Provider (PCP)    Please see details below            Examination   Physical Exam   Temp:  [97.4  F (36.3  C)-98.8  F (37.1  C)] 98  F (36.7  C)  Pulse:  [69-76] 72  Resp:  [18-21] 18  BP: (130-160)/(65-76) 160/75  SpO2:  [96 %-99 %] 98 %  Wt Readings from Last 1 Encounters:   02/19/25 79.7 kg (175 lb 11.3 oz)       General: No apparent distress  Heart: Rate rate and rhythm  Lungs: Clear to auscultation  Abdomen: Nontender, mild distention, no pain      Please see EMR for more detailed significant labs, imaging, consultant notes etc.    I, Dakota Perez MD, personally saw the patient today and spent greater than 30 minutes discharging this patient.    Dakota Perez MD  [facility]    CC:Juan Chase   "

## 2025-02-20 NOTE — PLAN OF CARE
Problem: Adult Inpatient Plan of Care  Goal: Optimal Comfort and Wellbeing  Outcome: Progressing     Problem: Comorbidity Management  Goal: Blood Glucose Levels Within Targeted Range  Outcome: Progressing  Intervention: Monitor and Manage Glycemia  Recent Flowsheet Documentation  Taken 2/20/2025 0200 by Tameka Lucero RN  Medication Review/Management: medications reviewed  Taken 2/20/2025 0000 by Tameka Lucero RN  Medication Review/Management: medications reviewed  Goal: Blood Pressure in Desired Range  Outcome: Progressing  Intervention: Maintain Blood Pressure Management  Recent Flowsheet Documentation  Taken 2/20/2025 0200 by Tameka Lucero RN  Medication Review/Management: medications reviewed  Taken 2/20/2025 0000 by Tameka Lucero RN  Medication Review/Management: medications reviewed     Problem: Intestinal Obstruction  Goal: Optimal Bowel Function  Outcome: Progressing  Intervention: Promote Bowel Function  Recent Flowsheet Documentation  Taken 2/20/2025 0000 by Tameka Lucero RN  Body Position: position changed independently  Head of Bed (HOB) Positioning: HOB at 20-30 degrees  Positioning/Transfer Devices:   pillows   in use  Goal: Fluid and Electrolyte Balance  Outcome: Progressing  Goal: Absence of Infection Signs and Symptoms  Outcome: Progressing  Intervention: Prevent or Manage Infection  Recent Flowsheet Documentation  Taken 2/20/2025 0200 by Tameka Lucero RN  Infection Management: aseptic technique maintained  Goal: Optimize Nutrition Status  Outcome: Progressing  Goal: Optimal Pain Control and Function  Outcome: Progressing     Problem: Delirium  Goal: Optimal Coping  Outcome: Progressing  Goal: Improved Behavioral Control  Outcome: Progressing  Intervention: Minimize Safety Risk  Recent Flowsheet Documentation  Taken 2/20/2025 0200 by Tameka Lucero RN  Enhanced Safety Measures:   pain management   review medications for side effects with  activity   patient/family teach back on injury risk  Taken 2/20/2025 0000 by Tameka Lucero, RN  Enhanced Safety Measures:   pain management   review medications for side effects with activity   patient/family teach back on injury risk  Goal: Improved Attention and Thought Clarity  Outcome: Progressing  Goal: Improved Sleep  Outcome: Progressing     Problem: Nausea and Vomiting  Goal: Nausea and Vomiting Relief  Outcome: Progressing  Intervention: Prevent and Manage Nausea and Vomiting  Recent Flowsheet Documentation  Taken 2/20/2025 0000 by Tameka Lucero RN  Oral Care: oral rinse provided  Assumed care at 2300  Admitted For: Recurring SBO   Vitals: VSS, afebrile  Neuro: A/O x 4   Cardiac: Reg radial         Respiratory:  O2 saturation > 92% on RA.   GI/:  Adequate UO via. Ambulates to bathroom  LBM 2/19/25 per pt. Pt had gastrografin challenge that clear his SBO issues+ BS,91  Diet/appetite: Clear liquids  Activity: Independent   Pain:  Addressed with PRN medication, pt denies pain  Skin: No adjustment needed  LDA's: L-PIV, SL    Plan: Requesting discharge at 10 am to secure transport home

## 2025-02-20 NOTE — PROGRESS NOTES
No complaints.  Passing gas.  Tolerating clear liquids that I called in and started last night.  Afebrile, vital signs stable.    Physical exam:  Abdomen is soft and nontender.    Impression: No bowel obstruction.  From our standpoint his diet can be advanced and he can be discharged home.  Sign off.

## 2025-02-20 NOTE — PLAN OF CARE
Problem: Comorbidity Management  Goal: Blood Glucose Levels Within Targeted Range  Outcome: Progressing  Intervention: Monitor and Manage Glycemia  Recent Flowsheet Documentation  Taken 2/19/2025 1633 by Gladys Page RN  Medication Review/Management: medications reviewed  Goal: Blood Pressure in Desired Range  Outcome: Progressing  Intervention: Maintain Blood Pressure Management  Recent Flowsheet Documentation  Taken 2/19/2025 1633 by Gladys Page RN  Medication Review/Management: medications reviewed     Problem: Intestinal Obstruction  Goal: Optimal Bowel Function  Outcome: Progressing  Intervention: Promote Bowel Function  Recent Flowsheet Documentation  Taken 2/19/2025 1633 by Gladys Page RN  Body Position: position changed independently  Head of Bed (HOB) Positioning: HOB at 20-30 degrees  Positioning/Transfer Devices:   pillows   in use     Goal Outcome Evaluation: Pt denies any pain. VSS. Afebrile. X-ray gastrografin done. BG WDL. On clear liquid diet and tolerated. Pt ambulating independently. Can make needs be known.

## 2025-02-20 NOTE — PLAN OF CARE
Problem: Comorbidity Management  Goal: Blood Glucose Levels Within Targeted Range  Outcome: Progressing     Problem: Intestinal Obstruction  Goal: Optimal Bowel Function  Outcome: Progressing     Problem: Adult Inpatient Plan of Care  Goal: Optimal Comfort and Wellbeing  Outcome: Progressing   Goal Outcome Evaluation: [id number]:     A&O x4. Cooperative. Denies pain. Independent in room. IV LL forearm infusing dextrose and NS at 75 ml/hr. Pt reports having 2-3 BM's. Clear liquid diet. Able to make needs known.

## 2025-02-22 ENCOUNTER — PATIENT OUTREACH (OUTPATIENT)
Dept: CARE COORDINATION | Facility: CLINIC | Age: 80
End: 2025-02-22
Payer: COMMERCIAL

## 2025-02-22 NOTE — PROGRESS NOTES
Connected Care Resource Center Contact  [facility]/Voicemail     Clinical Data: Post-Discharge Outreach     Outreach attempted x 2.  Left message on patient's voicemail, providing [facility]'s central phone number of [phone] ([phone]) for questions/concerns and/or to schedule an appt with an [facility] provider, if they do not have a PCP.      Plan:  [facility] will do no further outreaches at this time.        Chelita RAUSCH, Community Health Worker  Clinic Care Coordination  [facility]  Phone: [phone]      *Connected Care Resource Team does NOT follow patient ongoing. Referrals are identified based on internal discharge reports and the outreach is to ensure patient has an understanding of their discharge instructions

## 2025-04-17 NOTE — OR NURSING
"Pre-op call RN Assessment    Patient recently in [facility] ED. No pre-op on file, patient on 81 MG aspirin. Patient states that he \"usually stops it 3 days before any procedures.\" Patient instructed to follow up with Dr. Quinones's office to confirm.  "

## 2025-04-18 RX ORDER — MULTIVIT,TX WITH IRON,MINERALS
250 TABLET, EXTENDED RELEASE ORAL DAILY
COMMUNITY

## 2025-04-18 RX ORDER — CALCIPOTRIENE 50 UG/G
CREAM TOPICAL 2 TIMES DAILY PRN
COMMUNITY

## 2025-04-18 RX ORDER — AMOXICILLIN 500 MG
1200 CAPSULE ORAL DAILY
COMMUNITY

## 2025-04-18 RX ORDER — CLOBETASOL PROPIONATE 0.5 MG/G
OINTMENT TOPICAL 2 TIMES DAILY PRN
COMMUNITY

## 2025-04-18 RX ORDER — UBIDECARENONE 100 MG
300 CAPSULE ORAL DAILY
COMMUNITY

## 2025-04-18 RX ORDER — NYSTATIN 100000 U/G
CREAM TOPICAL 2 TIMES DAILY
COMMUNITY

## 2025-04-18 RX ORDER — CALCIUM CARB/VITAMIN D3/VIT K1 500-500-40
1 TABLET,CHEWABLE ORAL DAILY PRN
COMMUNITY

## 2025-04-18 RX ORDER — HYDROCORTISONE 25 MG/G
CREAM TOPICAL 2 TIMES DAILY
COMMUNITY

## 2025-04-18 RX ORDER — BENZONATATE 200 MG/1
200 CAPSULE ORAL 3 TIMES DAILY PRN
COMMUNITY

## 2025-04-18 RX ORDER — KETOCONAZOLE 20 MG/G
CREAM TOPICAL 2 TIMES DAILY
COMMUNITY

## 2025-04-18 RX ORDER — ALBUTEROL SULFATE 0.83 MG/ML
2.5 SOLUTION RESPIRATORY (INHALATION) EVERY 4 HOURS PRN
COMMUNITY

## 2025-04-18 RX ORDER — OXYMETAZOLINE HYDROCHLORIDE 0.05 G/100ML
2 SPRAY NASAL DAILY
COMMUNITY

## 2025-04-18 RX ORDER — CYANOCOBALAMIN (VITAMIN B-12) 500 MCG
400 LOZENGE ORAL DAILY
COMMUNITY

## 2025-04-18 RX ORDER — CALCIUM CARB, CITRATE, MALATE 250 MG
200 CAPSULE ORAL DAILY
COMMUNITY

## 2025-04-18 RX ORDER — AZELASTINE HYDROCHLORIDE, FLUTICASONE PROPIONATE 137; 50 UG/1; UG/1
1 SPRAY, METERED NASAL 2 TIMES DAILY PRN
COMMUNITY

## 2025-04-18 RX ORDER — TADALAFIL 5 MG/1
5 TABLET ORAL DAILY PRN
COMMUNITY

## 2025-04-18 RX ORDER — PSEUDOEPHEDRINE HCL 120 MG/1
120 TABLET, FILM COATED, EXTENDED RELEASE ORAL EVERY 12 HOURS
COMMUNITY

## 2025-04-18 RX ORDER — ALPRAZOLAM 0.25 MG
0.25 TABLET ORAL 2 TIMES DAILY PRN
COMMUNITY

## 2025-04-21 ENCOUNTER — HOSPITAL ENCOUNTER (OUTPATIENT)
Facility: HOSPITAL | Age: 80
Discharge: HOME | End: 2025-04-21
Attending: UROLOGY | Admitting: UROLOGY
Payer: COMMERCIAL

## 2025-04-21 ENCOUNTER — ANESTHESIA (OUTPATIENT)
Dept: SURGERY | Facility: HOSPITAL | Age: 80
End: 2025-04-21
Payer: COMMERCIAL

## 2025-04-21 ENCOUNTER — ANESTHESIA EVENT (OUTPATIENT)
Dept: SURGERY | Facility: HOSPITAL | Age: 80
End: 2025-04-21
Payer: COMMERCIAL

## 2025-04-21 VITALS
WEIGHT: 183.2 LBS | OXYGEN SATURATION: 95 % | HEIGHT: 71 IN | HEART RATE: 59 BPM | RESPIRATION RATE: 16 BRPM | DIASTOLIC BLOOD PRESSURE: 68 MMHG | SYSTOLIC BLOOD PRESSURE: 169 MMHG | TEMPERATURE: 98 F | BODY MASS INDEX: 25.65 KG/M2

## 2025-04-21 DIAGNOSIS — N21.0 BLADDER STONE: Primary | ICD-10-CM

## 2025-04-21 LAB — GLUCOSE BLDC GLUCOMTR-MCNC: 120 MG/DL (ref 70–99)

## 2025-04-21 PROCEDURE — 88305 TISSUE EXAM BY PATHOLOGIST: CPT | Mod: TC | Performed by: UROLOGY

## 2025-04-21 PROCEDURE — 370N000017 HC ANESTHESIA TECHNICAL FEE, PER MIN: Performed by: UROLOGY

## 2025-04-21 PROCEDURE — 360N000077 HC SURGERY LEVEL 4, PER MIN: Performed by: UROLOGY

## 2025-04-21 PROCEDURE — 250N000009 HC RX 250: Performed by: UROLOGY

## 2025-04-21 PROCEDURE — 250N000013 HC RX MED GY IP 250 OP 250 PS 637: Performed by: ANESTHESIOLOGY

## 2025-04-21 PROCEDURE — 258N000003 HC RX IP 258 OP 636

## 2025-04-21 PROCEDURE — 258N000003 HC RX IP 258 OP 636: Performed by: NURSE ANESTHETIST, CERTIFIED REGISTERED

## 2025-04-21 PROCEDURE — 272N000001 HC OR GENERAL SUPPLY STERILE: Performed by: UROLOGY

## 2025-04-21 PROCEDURE — 250N000009 HC RX 250

## 2025-04-21 PROCEDURE — 250N000011 HC RX IP 250 OP 636: Performed by: NURSE PRACTITIONER

## 2025-04-21 PROCEDURE — 258N000003 HC RX IP 258 OP 636: Performed by: ANESTHESIOLOGY

## 2025-04-21 PROCEDURE — 710N000009 HC RECOVERY PHASE 1, LEVEL 1, PER MIN: Performed by: UROLOGY

## 2025-04-21 PROCEDURE — 999N000141 HC STATISTIC PRE-PROCEDURE NURSING ASSESSMENT: Performed by: UROLOGY

## 2025-04-21 PROCEDURE — 272N000002 HC OR SUPPLY OTHER OPNP: Performed by: UROLOGY

## 2025-04-21 PROCEDURE — 250N000011 HC RX IP 250 OP 636

## 2025-04-21 PROCEDURE — 82962 GLUCOSE BLOOD TEST: CPT

## 2025-04-21 PROCEDURE — 258N000001 HC RX 258: Performed by: UROLOGY

## 2025-04-21 PROCEDURE — 250N000011 HC RX IP 250 OP 636: Mod: JZ | Performed by: NURSE ANESTHETIST, CERTIFIED REGISTERED

## 2025-04-21 PROCEDURE — 710N000012 HC RECOVERY PHASE 2, PER MINUTE: Performed by: UROLOGY

## 2025-04-21 PROCEDURE — 82365 CALCULUS SPECTROSCOPY: CPT | Performed by: UROLOGY

## 2025-04-21 PROCEDURE — 250N000025 HC SEVOFLURANE, PER MIN: Performed by: UROLOGY

## 2025-04-21 RX ORDER — ONDANSETRON 4 MG/1
4 TABLET, ORALLY DISINTEGRATING ORAL EVERY 30 MIN PRN
Status: DISCONTINUED | OUTPATIENT
Start: 2025-04-21 | End: 2025-04-21 | Stop reason: HOSPADM

## 2025-04-21 RX ORDER — LIDOCAINE 40 MG/G
CREAM TOPICAL
Status: DISCONTINUED | OUTPATIENT
Start: 2025-04-21 | End: 2025-04-21 | Stop reason: HOSPADM

## 2025-04-21 RX ORDER — OXYCODONE HYDROCHLORIDE 5 MG/1
10 TABLET ORAL
Status: DISCONTINUED | OUTPATIENT
Start: 2025-04-21 | End: 2025-04-21 | Stop reason: HOSPADM

## 2025-04-21 RX ORDER — LIDOCAINE HYDROCHLORIDE 20 MG/ML
JELLY TOPICAL ONCE
Status: DISCONTINUED | OUTPATIENT
Start: 2025-04-21 | End: 2025-04-21 | Stop reason: HOSPADM

## 2025-04-21 RX ORDER — CEFAZOLIN SODIUM/WATER 2 G/20 ML
2 SYRINGE (ML) INTRAVENOUS
Status: COMPLETED | OUTPATIENT
Start: 2025-04-21 | End: 2025-04-21

## 2025-04-21 RX ORDER — DEXAMETHASONE SODIUM PHOSPHATE 10 MG/ML
INJECTION, SOLUTION INTRAMUSCULAR; INTRAVENOUS PRN
Status: DISCONTINUED | OUTPATIENT
Start: 2025-04-21 | End: 2025-04-21

## 2025-04-21 RX ORDER — CEFAZOLIN SODIUM/WATER 2 G/20 ML
2 SYRINGE (ML) INTRAVENOUS SEE ADMIN INSTRUCTIONS
Status: DISCONTINUED | OUTPATIENT
Start: 2025-04-21 | End: 2025-04-21 | Stop reason: HOSPADM

## 2025-04-21 RX ORDER — PROPOFOL 10 MG/ML
INJECTION, EMULSION INTRAVENOUS PRN
Status: DISCONTINUED | OUTPATIENT
Start: 2025-04-21 | End: 2025-04-21

## 2025-04-21 RX ORDER — FENTANYL CITRATE 50 UG/ML
50 INJECTION, SOLUTION INTRAMUSCULAR; INTRAVENOUS EVERY 5 MIN PRN
Status: DISCONTINUED | OUTPATIENT
Start: 2025-04-21 | End: 2025-04-21 | Stop reason: HOSPADM

## 2025-04-21 RX ORDER — DEXAMETHASONE SODIUM PHOSPHATE 10 MG/ML
4 INJECTION, SOLUTION INTRAMUSCULAR; INTRAVENOUS
Status: DISCONTINUED | OUTPATIENT
Start: 2025-04-21 | End: 2025-04-21 | Stop reason: HOSPADM

## 2025-04-21 RX ORDER — ONDANSETRON 2 MG/ML
4 INJECTION INTRAMUSCULAR; INTRAVENOUS EVERY 30 MIN PRN
Status: DISCONTINUED | OUTPATIENT
Start: 2025-04-21 | End: 2025-04-21 | Stop reason: HOSPADM

## 2025-04-21 RX ORDER — GLYCOPYRROLATE 0.2 MG/ML
INJECTION, SOLUTION INTRAMUSCULAR; INTRAVENOUS PRN
Status: DISCONTINUED | OUTPATIENT
Start: 2025-04-21 | End: 2025-04-21

## 2025-04-21 RX ORDER — SODIUM CHLORIDE, SODIUM LACTATE, POTASSIUM CHLORIDE, CALCIUM CHLORIDE 600; 310; 30; 20 MG/100ML; MG/100ML; MG/100ML; MG/100ML
INJECTION, SOLUTION INTRAVENOUS CONTINUOUS
Status: DISCONTINUED | OUTPATIENT
Start: 2025-04-21 | End: 2025-04-21 | Stop reason: HOSPADM

## 2025-04-21 RX ORDER — OXYCODONE HYDROCHLORIDE 5 MG/1
5 TABLET ORAL
Status: COMPLETED | OUTPATIENT
Start: 2025-04-21 | End: 2025-04-21

## 2025-04-21 RX ORDER — FENTANYL CITRATE 50 UG/ML
25 INJECTION, SOLUTION INTRAMUSCULAR; INTRAVENOUS EVERY 5 MIN PRN
Status: DISCONTINUED | OUTPATIENT
Start: 2025-04-21 | End: 2025-04-21 | Stop reason: HOSPADM

## 2025-04-21 RX ORDER — FENTANYL CITRATE 50 UG/ML
INJECTION, SOLUTION INTRAMUSCULAR; INTRAVENOUS PRN
Status: DISCONTINUED | OUTPATIENT
Start: 2025-04-21 | End: 2025-04-21

## 2025-04-21 RX ORDER — ONDANSETRON 2 MG/ML
INJECTION INTRAMUSCULAR; INTRAVENOUS PRN
Status: DISCONTINUED | OUTPATIENT
Start: 2025-04-21 | End: 2025-04-21

## 2025-04-21 RX ORDER — NALOXONE HYDROCHLORIDE 0.4 MG/ML
0.1 INJECTION, SOLUTION INTRAMUSCULAR; INTRAVENOUS; SUBCUTANEOUS
Status: DISCONTINUED | OUTPATIENT
Start: 2025-04-21 | End: 2025-04-21 | Stop reason: HOSPADM

## 2025-04-21 RX ORDER — DEXAMETHASONE SODIUM PHOSPHATE 4 MG/ML
4 INJECTION, SOLUTION INTRA-ARTICULAR; INTRALESIONAL; INTRAMUSCULAR; INTRAVENOUS; SOFT TISSUE
Status: DISCONTINUED | OUTPATIENT
Start: 2025-04-21 | End: 2025-04-21 | Stop reason: HOSPADM

## 2025-04-21 RX ORDER — LIDOCAINE HYDROCHLORIDE 10 MG/ML
INJECTION, SOLUTION INFILTRATION; PERINEURAL PRN
Status: DISCONTINUED | OUTPATIENT
Start: 2025-04-21 | End: 2025-04-21

## 2025-04-21 RX ORDER — NALOXONE HYDROCHLORIDE 1 MG/ML
0.1 INJECTION INTRAMUSCULAR; INTRAVENOUS; SUBCUTANEOUS
Status: DISCONTINUED | OUTPATIENT
Start: 2025-04-21 | End: 2025-04-21 | Stop reason: HOSPADM

## 2025-04-21 RX ADMIN — OXYCODONE HYDROCHLORIDE 5 MG: 5 TABLET ORAL at 17:58

## 2025-04-21 RX ADMIN — PHENYLEPHRINE HYDROCHLORIDE 100 MCG: 10 INJECTION INTRAVENOUS at 14:35

## 2025-04-21 RX ADMIN — PHENYLEPHRINE HYDROCHLORIDE 150 MCG: 10 INJECTION INTRAVENOUS at 14:43

## 2025-04-21 RX ADMIN — ONDANSETRON 4 MG: 2 INJECTION INTRAMUSCULAR; INTRAVENOUS at 14:37

## 2025-04-21 RX ADMIN — LIDOCAINE HYDROCHLORIDE 4 ML: 10 INJECTION, SOLUTION INFILTRATION; PERINEURAL at 14:27

## 2025-04-21 RX ADMIN — PHENYLEPHRINE HYDROCHLORIDE 150 MCG: 10 INJECTION INTRAVENOUS at 14:49

## 2025-04-21 RX ADMIN — DEXAMETHASONE SODIUM PHOSPHATE 4 MG: 10 INJECTION, SOLUTION INTRAMUSCULAR; INTRAVENOUS at 14:37

## 2025-04-21 RX ADMIN — FENTANYL CITRATE 50 MCG: 50 INJECTION, SOLUTION INTRAMUSCULAR; INTRAVENOUS at 14:41

## 2025-04-21 RX ADMIN — PHENYLEPHRINE HYDROCHLORIDE 0.2 MCG/KG/MIN: 10 INJECTION INTRAVENOUS at 14:52

## 2025-04-21 RX ADMIN — FENTANYL CITRATE 50 MCG: 50 INJECTION, SOLUTION INTRAMUSCULAR; INTRAVENOUS at 14:22

## 2025-04-21 RX ADMIN — GLYCOPYRROLATE 0.2 MG: 0.2 INJECTION INTRAMUSCULAR; INTRAVENOUS at 14:50

## 2025-04-21 RX ADMIN — SODIUM CHLORIDE, SODIUM LACTATE, POTASSIUM CHLORIDE, AND CALCIUM CHLORIDE: .6; .31; .03; .02 INJECTION, SOLUTION INTRAVENOUS at 14:22

## 2025-04-21 RX ADMIN — GLYCOPYRROLATE 0.2 MG: 0.2 INJECTION INTRAMUSCULAR; INTRAVENOUS at 14:45

## 2025-04-21 RX ADMIN — PROPOFOL 160 MG: 10 INJECTION, EMULSION INTRAVENOUS at 14:28

## 2025-04-21 RX ADMIN — Medication 2 G: at 14:20

## 2025-04-21 ASSESSMENT — ACTIVITIES OF DAILY LIVING (ADL)
ADLS_ACUITY_SCORE: 35
ADLS_ACUITY_SCORE: 56
ADLS_ACUITY_SCORE: 38
ADLS_ACUITY_SCORE: 38
ADLS_ACUITY_SCORE: 35
ADLS_ACUITY_SCORE: 38
ADLS_ACUITY_SCORE: 35

## 2025-04-21 NOTE — OP NOTE
Date of surgery: 4/21/2025    [place] [facility] [place]ating room: 2    Surgeon: Dr. Adithya Quinones.     Assistant: None    Anesthesia: General    Preoperative diagnosis: Bladder stone    Postoperative diagnosis: Same plus bladder tumors    Procedure: Cystoscopy, bladder biopsy with fulguration, removal of bladder stone.    Operative indication: Timur Bullard is a 79 year old male with a small bladder stone in a diverticulum who presents now for removal.    Operative findings: There are several bladder tumors.  One of them involves a diverticulum on the right side of the bladder.  They measure less than 2 cm.  The 1 on the right side extends out the neck of the diverticulum.  It is flat.  It has the appearance of low-grade urothelial carcinoma.  The second tumor is on the floor of the bladder near the trigone to the left of midline and not involving the ureteral orifice.  There is a tiny stone in a bladder diverticulum near the right ureteral orifice.  It is extracted.    Operative procedure: The patient was brought to the operating room.  General anesthesia was administered.  The patient was placed in lithotomy position and prepared and draped in sterile fashion.  I introduced a flexible cystoscope per urethra and into the bladder.  There is some cicatrix formation within the prostatic urethra but no true strictures.  The bladder was thoroughly inspected.  Findings are described above.  There is a small stone in the bladder diverticulum near the right ureteral orifice.  It is extracted with a basket device.  I then inserted the 22 [place] rigid scope.  I used the cup biopsy forceps to remove the lesion on the bladder floor.  I used the Bugbee device to cauterize the biopsied area as well as the area around it.  I then biopsied the area around the right sided diverticulum.  This is sent as a second specimen.  Last, I biopsied a small portion of the tissue inside the diverticulum.  I did this at the neck of  the diverticulum but within the diverticulum.  I used the Bugbee device to obtain hemostasis here.  This is the only area where not all visible tumor is.  I did apply cautery to the area biopsied in the diverticulum as well as the area of tumor around the diverticulum.  With the bladder decompressed, there is no evidence for bleeding.  The scope was removed and an 18 [place] coudé catheter was placed per urethra.  The bladder was drained.  The drainage is clear.    Drains: Catheter per urethra    Specimens: Bladder biopsies of the diverticulum, area outside the diverticulum and left bladder floor.  Bladder stone (about 1 mm).    Estimated blood loss: 0    Complications: None    Adithya Quinones MD

## 2025-04-21 NOTE — ANESTHESIA PROCEDURE NOTES
Airway       Patient location during procedure: OR  Staff -        CRNA: Memo Chamorro APRN CRNA       Performed By: CRNA  Consent for Airway        Urgency: elective  Indications and Patient Condition       Indications for airway management: chauncey-procedural       Induction type:intravenous       Mask difficulty assessment: 0 - not attempted    Final Airway Details       Final airway type: supraglottic airway    Supraglottic Airway Details        Type: LMA       Brand: I-Gel       LMA size: 5    Post intubation assessment        Placement verified by: capnometry, equal breath sounds and chest rise        Number of attempts at approach: 1       Number of other approaches attempted: 0       Ease of procedure: easy       Dentition: Intact and Unchanged

## 2025-04-21 NOTE — ANESTHESIA POSTPROCEDURE EVALUATION
Patient: Timur Bullard    Procedure: Procedure(s):  CYSTOLITHOLAPAXY,BLADDER BIOPSIES WITH FULGURATION AND BLADDER STONE REMOVAL       Anesthesia Type:  General    Note:  Disposition: Outpatient   Postop Pain Control: Uneventful            Sign Out: Well controlled pain   PONV: No   Neuro/Psych: Uneventful            Sign Out: Acceptable/Baseline neuro status   Airway/Respiratory: Uneventful            Sign Out: Acceptable/Baseline resp. status   CV/Hemodynamics: Uneventful            Sign Out: Acceptable CV status; No obvious hypovolemia; No obvious fluid overload   Other NRE: NONE   DID A NON-ROUTINE EVENT OCCUR? No           Last vitals:  Vitals Value Taken Time   /72 04/21/25 1534   Temp 36.7  C (98  F) 04/21/25 1534   Pulse 74 04/21/25 1538   Resp 15 04/21/25 1538   SpO2 96 % 04/21/25 1538   Vitals shown include unfiled device data.    Electronically Signed By: Malcolm Carlson MD  April 21, 2025  3:40 PM

## 2025-04-21 NOTE — ADDENDUM NOTE
Addendum  created 04/21/25 1626 by Memo Chamorro APRN CRNA    Clinical Note Signed, Intraprocedure Blocks edited, SmartForm saved

## 2025-04-21 NOTE — ANESTHESIA CARE TRANSFER NOTE
Patient: Timur Bullard    Procedure: Procedure(s):  CYSTOLITHOLAPAXY,BLADDER BIOPSIES WITH FULGURATION AND BLADDER STONE REMOVAL       Diagnosis: Calculus in bladder [N21.0]  Diagnosis Additional Information: No value filed.    Anesthesia Type:   General     Note:    Oropharynx: oropharynx clear of all foreign objects  Level of Consciousness: awake  Oxygen Supplementation: room air    Independent Airway: airway patency satisfactory and stable  Dentition: dentition unchanged  Vital Signs Stable: post-procedure vital signs reviewed and stable  Report to RN Given: handoff report given  Patient transferred to: PACU    Handoff Report: Identifed the Patient, Identified the Reponsible Provider, Reviewed the pertinent medical history, Discussed the surgical course, Reviewed Intra-OP anesthesia mangement and issues during anesthesia, Set expectations for post-procedure period and Allowed opportunity for questions and acknowledgement of understanding      Vitals:  Vitals Value Taken Time   /72 04/21/25 1534   Temp 98.2    Pulse 75 04/21/25 1535   Resp 17 04/21/25 1535   SpO2 98 % 04/21/25 1535   Vitals shown include unfiled device data.    Electronically Signed By: KINSEY Lane CRNA  April 21, 2025  3:36 PM

## 2025-04-21 NOTE — INTERVAL H&P NOTE
"I have reviewed the surgical (or preoperative) H&P that is linked to this encounter, and examined the patient. There are no significant changes    Clinical Conditions Present on Arrival:  Clinically Significant Risk Factors Present on Admission                  # Drug Induced Platelet Defect: home medication list includes an antiplatelet medication      # Overweight: Estimated body mass index is 25.55 kg/m  as calculated from the following:    Height as of this encounter: 1.803 m (5' 11\").    Weight as of this encounter: 83.1 kg (183 lb 3.2 oz).       "

## 2025-04-21 NOTE — ANESTHESIA PREPROCEDURE EVALUATION
"Anesthesia Pre-Procedure Evaluation    Patient: Timur Bullard   MRN: [id number] : 1945        Preoperative Diagnosis: Calculus of kidney [N20.0]    Procedure : Procedure(s):  CYSTOSCOPY, CYSTOLITHOLAPAXY  RIGHT URETEROSCOPY, WITH HOLMIUM LASER LITHOTRIPSY AND DOUBLE J  URETERAL STENT PLACEMENT          Past Medical History:   Diagnosis Date    Age-related macular degeneration     USMAN (acute kidney injury)     Bladder stones     Coronary artery disease involving native coronary artery of native heart without angina pectoris 2025    Dyslipidemia     Goiter     Hydrocele     Hypertension     Irregular heart beat     Psoriasis     Stented coronary artery     Tinnitus, bilateral     Type 2 diabetes mellitus with hyperglycemia, without long-term current use of insulin (H) 2020      Past Surgical History:   Procedure Laterality Date    CYSTOSCOPY  2015    CYSTOSCOPY  11/15/2021    cystolitholapaxy, right stent    CYSTOSCOPY W/ LITHOLAPAXY / EHL N/A 11/15/2021    Procedure: CYSTOSCOPY, REMOVAL OF BLADDER STONES;  Surgeon: Adithya Quinones MD;  [place] [facility] [place]    CYSTOURETEROSCOPY, WITH LITHOTRIPSY USING AARTI 120P LASER AND URETERAL STENT INSERTION Right 11/15/2021    Procedure: RIGHT URETEROSCOPY, WITH HOLMIUM LASER LITHOTRIPSY, STONE EXTRACTION AND DOUBLE J  URETERAL STENT PLACEMENT RIGHT;  Surgeon: Adithya Quinones MD;  [place] [facility] [place]    [place] SURGERY      KIDNEY SURGERY Right       Allergies   Allergen Reactions    Ciprofloxacin Other (See Comments)     \"I get loopy\"    Coal Tar Extract Itching    Gadolinium-Containing Contrast Media [Gadolinium Derivatives] Hives    Percocet [Oxycodone-Acetaminophen] Other (See Comments)     Delusional       Social History     Tobacco Use    Smoking status: Former     Current packs/day: 0.00     Types: Cigarettes     Quit date: 1978     Years since quittin.3    Smokeless tobacco: Never   Substance Use Topics    " Alcohol use: Yes     Comment: Alcoholic Drinks/day: Occasionally      Wt Readings from Last 1 Encounters:   04/21/25 83.1 kg (183 lb 3.2 oz)        Anesthesia Evaluation   Pt has had prior anesthetic.     History of anesthetic complications (very sore throat x 2 days)       ROS/MED HX  ENT/Pulmonary:    (-) sleep apnea and recent URI   Neurologic:  - neg neurologic ROS     Cardiovascular:     (+)  hypertension- -  CAD -  - -                          Irregular Heartbeat/Palpitations,            METS/Exercise Tolerance:     Hematologic:       Musculoskeletal:       GI/Hepatic:       Renal/Genitourinary:     (+) renal disease, type: ARF,            Endo:     (+) type I DM,         thyroid problem,            Psychiatric/Substance Use:  - neg psychiatric ROS     Infectious Disease:  - neg infectious disease ROS     Malignancy:  - neg malignancy ROS     Other:  - neg other ROS          Physical Exam    Airway        Mallampati: II   TM distance: > 3 FB   Neck ROM: full   Mouth opening: > 3 cm    Respiratory Devices and Support         Dental       (+) caps      Cardiovascular   cardiovascular exam normal          Pulmonary   pulmonary exam normal                OUTSIDE LABS:  CBC:   Lab Results   Component Value Date    WBC 7.7 07/20/2024    WBC 9.6 07/19/2024    HGB 15.1 07/20/2024    HGB 14.2 07/19/2024    HCT 43.2 07/20/2024    HCT 42.3 07/19/2024    PLT 65 (L) 02/18/2025    PLT  07/20/2024      Comment:      Platelets clumped. Platelet count not available.  This is a corrected result. Previous result was 50 10e3/uL on 7/20/2024 at  6:44 AM CDT     BMP:   Lab Results   Component Value Date     07/20/2024     07/19/2024    POTASSIUM 3.8 07/21/2024    POTASSIUM 4.0 07/20/2024    CHLORIDE 104 07/20/2024    CHLORIDE 104 07/19/2024    CO2 24 07/20/2024    CO2 24 07/19/2024    BUN 14.2 07/20/2024    BUN 18.3 07/19/2024    CR 0.99 02/18/2025    CR 0.92 07/20/2024     (H) 04/21/2025     (H)  "02/20/2025     COAGS: No results found for: \"PTT\", \"INR\", \"FIBR\"  POC: No results found for: \"BGM\", \"HCG\", \"HCGS\"  HEPATIC:   Lab Results   Component Value Date    ALBUMIN 4.1 07/19/2024    PROTTOTAL 6.4 07/19/2024    ALT 27 07/19/2024    AST 19 07/19/2024    ALKPHOS 65 07/19/2024    BILITOTAL 1.8 (H) 07/19/2024     OTHER:   Lab Results   Component Value Date    A1C 9.2 (H) 01/28/2020    SUSAN 9.1 07/20/2024    MAG 2.1 07/21/2024    TSH 0.99 05/02/2008    T4 0.96 05/02/2008    T3 172 05/02/2008       Anesthesia Plan    ASA Status:  3       Anesthesia Type: General.     - Airway: LMA              Consents    Anesthesia Plan(s) and associated risks, benefits, and realistic alternatives discussed. Questions answered and patient/representative(s) expressed understanding.     - Discussed:     - Discussed with:  Patient       - Patient is DNR/DNI Status: No          Postoperative Care    Pain management: IV analgesics, Oral pain medications, Multi-modal analgesia.   PONV prophylaxis: Ondansetron (or other 5HT-3), Dexamethasone or Solumedrol     Comments:                Malcolm Carlson MD  "

## 2025-04-22 ENCOUNTER — ANCILLARY PROCEDURE (OUTPATIENT)
Dept: ULTRASOUND IMAGING | Facility: HOSPITAL | Age: 80
End: 2025-04-22
Attending: EMERGENCY MEDICINE
Payer: COMMERCIAL

## 2025-04-22 ENCOUNTER — HOSPITAL ENCOUNTER (EMERGENCY)
Facility: HOSPITAL | Age: 80
Discharge: HOME | End: 2025-04-22
Attending: EMERGENCY MEDICINE | Admitting: EMERGENCY MEDICINE
Payer: COMMERCIAL

## 2025-04-22 VITALS
HEART RATE: 75 BPM | WEIGHT: 183 LBS | RESPIRATION RATE: 16 BRPM | SYSTOLIC BLOOD PRESSURE: 141 MMHG | OXYGEN SATURATION: 98 % | TEMPERATURE: 98 F | HEIGHT: 71 IN | DIASTOLIC BLOOD PRESSURE: 64 MMHG | BODY MASS INDEX: 25.62 KG/M2

## 2025-04-22 DIAGNOSIS — R34 DECREASED URINE OUTPUT: ICD-10-CM

## 2025-04-22 DIAGNOSIS — E86.0 MILD DEHYDRATION: ICD-10-CM

## 2025-04-22 LAB
ANION GAP SERPL CALCULATED.3IONS-SCNC: 15 MMOL/L (ref 7–15)
BUN SERPL-MCNC: 20.9 MG/DL (ref 8–23)
CALCIUM SERPL-MCNC: 9.4 MG/DL (ref 8.8–10.4)
CHLORIDE SERPL-SCNC: 101 MMOL/L (ref 98–107)
CREAT SERPL-MCNC: 0.97 MG/DL (ref 0.67–1.17)
EGFRCR SERPLBLD CKD-EPI 2021: 80 ML/MIN/1.73M2
GLUCOSE SERPL-MCNC: 259 MG/DL (ref 70–99)
HCO3 SERPL-SCNC: 21 MMOL/L (ref 22–29)
HOLD SPECIMEN: NORMAL
POTASSIUM SERPL-SCNC: 4.6 MMOL/L (ref 3.4–5.3)
SODIUM SERPL-SCNC: 137 MMOL/L (ref 135–145)

## 2025-04-22 PROCEDURE — 51798 US URINE CAPACITY MEASURE: CPT

## 2025-04-22 PROCEDURE — 36415 COLL VENOUS BLD VENIPUNCTURE: CPT | Performed by: EMERGENCY MEDICINE

## 2025-04-22 PROCEDURE — 99284 EMERGENCY DEPT VISIT MOD MDM: CPT | Mod: 25

## 2025-04-22 PROCEDURE — 96360 HYDRATION IV INFUSION INIT: CPT

## 2025-04-22 PROCEDURE — 76705 ECHO EXAM OF ABDOMEN: CPT

## 2025-04-22 PROCEDURE — 80048 BASIC METABOLIC PNL TOTAL CA: CPT | Performed by: EMERGENCY MEDICINE

## 2025-04-22 PROCEDURE — 82310 ASSAY OF CALCIUM: CPT | Performed by: EMERGENCY MEDICINE

## 2025-04-22 PROCEDURE — 258N000003 HC RX IP 258 OP 636: Performed by: EMERGENCY MEDICINE

## 2025-04-22 RX ADMIN — SODIUM CHLORIDE 500 ML: 0.9 INJECTION, SOLUTION INTRAVENOUS at 03:01

## 2025-04-22 ASSESSMENT — ACTIVITIES OF DAILY LIVING (ADL)
ADLS_ACUITY_SCORE: 57
ADLS_ACUITY_SCORE: 57

## 2025-04-22 ASSESSMENT — ENCOUNTER SYMPTOMS
ABDOMINAL PAIN: 0
FLANK PAIN: 0

## 2025-04-22 NOTE — ED TRIAGE NOTES
Patient had a urinary catheter placed this afternoon after surgery for a kidney stone. Patient reports in the last six hours he has only had 15-30 ml's of urine output. Patient denies abdominal pain but does have some burning and urge to urinate.      Triage Assessment (Adult)       Row Name 04/22/25 0221          Triage Assessment    Airway WDL WDL        Respiratory WDL    Respiratory WDL WDL        Cardiac WDL    Cardiac WDL WDL        Peripheral/Neurovascular WDL    Peripheral Neurovascular WDL WDL        Cognitive/Neuro/Behavioral WDL    Cognitive/Neuro/Behavioral WDL WDL

## 2025-04-22 NOTE — DISCHARGE INSTRUCTIONS
Remove castro catheter Thursday morning if no complications. Call surgeon if there are questions or problems.

## 2025-04-22 NOTE — ED PROVIDER NOTES
EMERGENCY DEPARTMENT ENCOUNTER      NAME: Timur Blulard  AGE: 79 year old male  YOB: 1945  MRN: [id number]  EVALUATION DATE & TIME: No admission date for patient encounter.    PCP: Juan Chase    ED PROVIDER: Sonny Avery MD      Chief Complaint   Patient presents with    Catheter Problem         FINAL IMPRESSION:  1. Mild dehydration    2. Decreased urine output          ED COURSE & MEDICAL DECISION MAKING:    Pertinent Labs & Imaging studies reviewed. (See chart for details)  79 year old male presents to the Emergency Department for evaluation of for decreased urine output after having Polo catheter placed 12 hours ago.  Patient does have dark urine in the bag.    ED Course as of 04/22/25 0410   Tue Apr 22, 2025   0246 Patient presents with concern for decreased urine output since Polo catheter placed yesterday   0247 Patient had surgery roughly 12 hours ago and has a Polo catheter in place and is worried because does not seem to be draining.  Denies any abdominal pain or flank pain   0247 Bladder scan 14 mL, point-of-care ultrasound does not show any bladder distention   0247 Catheter easily flushes   0247 Will obtain BMP and give 500 cc of fluid   Normal BMP.  With IV fluids and is producing urine.  Catheter easily flushes.  No bladder distention or symptoms suggestive of hydronephrosis    Doubt UTI.  Plan for discharge home return to the ER for any worsening symptoms    2:29 AM I met the patient and performed my initial interview and exam.  2:48 AM I rechecked the patient after his catheter was flushed.  3:59 AM Rechecked and updated patient on labs and imaging. We discussed plan for discharge and all questions were answered.    Medical Decision Making  I reviewed the EMR: Outpatient Record: Operative note from today with urology  Discharge. No recommendations on prescription strength medication(s). N/A.    MIPS (CTPE, Dental pain, Polo, Sinusitis, Asthma/COPD, Head Trauma): Not  Applicable    SEPSIS: None              At the conclusion of the encounter I discussed the results of all of the tests and the disposition. The questions were answered. The patient or family acknowledged understanding and was agreeable with the care plan.       Voice recognition software used for this note,  any grammatical or spelling errors are non-intentional. Please contact the author of this note directly if you are in need of any clarification.      MEDICATIONS GIVEN IN THE EMERGENCY:  Medications   sodium chloride 0.9% BOLUS 500 mL (0 mLs Intravenous Stopped 4/22/25 0402)       NEW PRESCRIPTIONS STARTED AT TODAY'S ER VISIT  Discharge Medication List as of 4/22/2025  4:03 AM             =================================================================    TRIAGE ASSESSMENT:        HPI    Patient information was obtained from: patient    Use of : N/A       Timur Bullard is a 79 year old male with a pertinent history of recent bladder biopsy, bladder stones, right renal mass,  who presents to this ED via walk-in for evaluation of urinary retention.    Since the patient's procedure earlier, the patient has drank about 25 oz of water but has noted no urine draining from his catheter. Concerned that this may signify a catheter issue, the patient present to the ER.    He notes some mild penile burning from the catheter insertion, but denies abdominal pain and flank pain.    Per Chart Review:  Admission to [facility] on 21-Apr-2025 for bladder biopsies with fulguration and stone removal. Several bladder tumors noted. Small stone (about 1 mm) in the bladder diverticulum was removed. 18 Faroese catheter placed after procedure. Bladder was drained, with clear drainage. Discharged in stable conditions with plans to remove the catheter on 24-Apr-2025 if there are no complications.      REVIEW OF SYSTEMS   Review of Systems   Gastrointestinal:  Negative for abdominal pain.   Genitourinary:  Positive  for decreased urine volume. Negative for flank pain.        Positive for penile burning sensation        PAST MEDICAL HISTORY:  Past Medical History:   Diagnosis Date    Age-related macular degeneration     USMAN (acute kidney injury)     Bladder stones     Coronary artery disease involving native coronary artery of native heart without angina pectoris 02/18/2025    Dyslipidemia     Goiter     Hydrocele     Hypertension     Irregular heart beat     Psoriasis     Stented coronary artery     Tinnitus, bilateral     Type 2 diabetes mellitus with hyperglycemia, without long-term current use of insulin (H) 01/30/2020       PAST SURGICAL HISTORY:  Past Surgical History:   Procedure Laterality Date    CYSTOSCOPY  12/30/2015    CYSTOSCOPY  11/15/2021    cystolitholapaxy, right stent    CYSTOSCOPY W/ LITHOLAPAXY / EHL N/A 11/15/2021    Procedure: CYSTOSCOPY, REMOVAL OF BLADDER STONES;  Surgeon: Adithya Quinones MD;  Location: [facility] [place]    CYSTOURETEROSCOPY, WITH LITHOTRIPSY USING AARTI 120P LASER AND URETERAL STENT INSERTION Right 11/15/2021    Procedure: RIGHT URETEROSCOPY, WITH HOLMIUM LASER LITHOTRIPSY, STONE EXTRACTION AND DOUBLE J  URETERAL STENT PLACEMENT RIGHT;  Surgeon: Adithya Quinones MD;  Location: [facility] [place] [facility] OR    ENT SURGERY      KIDNEY SURGERY Right            CURRENT MEDICATIONS:    albuterol (PROAIR HFA;PROVENTIL HFA;VENTOLIN HFA) 90 mcg/actuation inhaler  albuterol (PROVENTIL) (2.5 MG/3ML) 0.083% neb solution  alpha-lipoic acid 100 MG capsule  ALPRAZolam (XANAX) 0.25 MG tablet  amLODIPine (NORVASC) 5 MG tablet  arginine 500 MG capsule  azelastine-fluticasone (DYMISTA) 137-50 MCG/ACT nasal spray  b complex vitamins tablet  benzonatate (TESSALON) 200 MG capsule  budesonide (RINOCORT AQUA) 32 MCG/ACT nasal spray  calcipotriene (DOVONOX) 0.005 % external cream  cholecalciferol, vitamin D3, 1,000 unit tablet  Chromium Picolinate 1000 MCG TABS  clobetasol (TEMOVATE) 0.05 % external  "ointment  co-enzyme Q-10 100 MG CAPS capsule  GUAIFENESIN 1200 OR  hydrocortisone 2.5 % cream  ketoconazole (NIZORAL) 2 % external cream  levocetirizine (XYZAL) 5 MG tablet  magnesium gluconate (MAGONATE) 250 MG tablet  metFORMIN (GLUCOPHAGE) 500 MG tablet  metoprolol succinate ER (TOPROL-XL) 25 MG 24 hr tablet  nitroGLYcerin (NITROSTAT) 0.4 MG sublingual tablet  nystatin (MYCOSTATIN) 914720 UNIT/GM external cream  Omega-3 Fatty Acids (FISH OIL) 1200 MG capsule  oxymetazoline (AFRIN) 0.05 % nasal spray  potassium citrate (UROCIT-K) 10 MEQ (1080 MG) CR tablet  pseudoePHEDrine (SUDAFED) 120 MG 12 hr tablet  rosuvastatin (CRESTOR) 20 MG tablet  Selenium 200 MCG CAPS  sodium chloride (OCEAN) 0.65 % nasal spray  tadalafil (CIALIS) 5 MG tablet  verapamil ER (VERELAN) 120 MG 24 hr capsule  vitamin E 400 units TABS        ALLERGIES:  Allergies   Allergen Reactions    Ciprofloxacin Other (See Comments)     \"I get loopy\"    Coal Tar Extract Itching    Gadolinium-Containing Contrast Media [Gadolinium Derivatives] Hives    Percocet [Oxycodone-Acetaminophen] Other (See Comments)     Delusional        FAMILY HISTORY:  Family History   Problem Relation Age of Onset    Heart Disease Father        SOCIAL HISTORY:   Social History     Socioeconomic History    Marital status:    Tobacco Use    Smoking status: Former     Current packs/day: 0.00     Types: Cigarettes     Quit date: 1978     Years since quittin.3    Smokeless tobacco: Never   Vaping Use    Vaping status: Never Used   Substance and Sexual Activity    Alcohol use: Yes     Comment: Alcoholic Drinks/day: Occasionally    Drug use: No     Social Drivers of Health     Financial Resource Strain: Low Risk  (2025)    Financial Resource Strain     Within the past 12 months, have you or your family members you live with been unable to get utilities (heat, electricity) when it was really needed?: No   Food Insecurity: Low Risk  (2025)    Food Insecurity     " "Within the past 12 months, did you worry that your food would run out before you got money to buy more?: No     Within the past 12 months, did the food you bought just not last and you didn t have money to get more?: No   Transportation Needs: Low Risk  (2/19/2025)    Transportation Needs     Within the past 12 months, has lack of transportation kept you from medical appointments, getting your medicines, non-medical meetings or appointments, work, or from getting things that you need?: No   Social Connections: Socially Integrated (1/24/2024)    Received [facility] Review Trackers [facility]    Social Connections     Do you often feel lonely or isolated from those around you?: 0   Interpersonal Safety: Low Risk  (4/21/2025)    Interpersonal Safety     Do you feel physically and emotionally safe where you currently live?: Yes     Within the past 12 months, have you been hit, slapped, kicked or otherwise physically hurt by someone?: No     Within the past 12 months, have you been humiliated or emotionally abused in other ways by your partner or ex-partner?: No   Housing Stability: Low Risk  (2/19/2025)    Housing Stability     Do you have housing? : Yes     Are you worried about losing your housing?: No       VITALS:  BP (!) 141/64   Pulse 75   Temp 98  F (36.7  C)   Resp 16   Ht 1.803 m (5' 11\")   Wt 83 kg (183 lb)   SpO2 98%   BMI 25.52 kg/m      PHYSICAL EXAM      Vitals: BP (!) 141/64   Pulse 75   Temp 98  F (36.7  C)   Resp 16   Ht 1.803 m (5' 11\")   Wt 83 kg (183 lb)   SpO2 98%   BMI 25.52 kg/m    General: Appears in no acute distress, awake, alert, interactive.  Eyes: Conjunctivae non-injected. Sclera anicteric.  HENT: Atraumatic.  Neck: Supple.  Respiratory/Chest: Respiration unlabored.  Abdomen: non distended, no abdominal tenderness  Musculoskeletal: Normal extremities. No edema or erythema.  Skin: Normal color. No rash or diaphoresis.  Neurologic: Face symmetric, moves all " extremities spontaneously. Speech clear.  Psychiatric: Oriented to person, place, and time. Affect appropriate.    LAB:  All pertinent labs reviewed and interpreted.  Results for orders placed or performed during the hospital encounter of 04/22/25   Basic metabolic panel   Result Value Ref Range    Sodium 137 135 - 145 mmol/L    Potassium 4.6 3.4 - 5.3 mmol/L    Chloride 101 98 - 107 mmol/L    Carbon Dioxide (CO2) 21 (L) 22 - 29 mmol/L    Anion Gap 15 7 - 15 mmol/L    Urea Nitrogen 20.9 8.0 - 23.0 mg/dL    Creatinine 0.97 0.67 - 1.17 mg/dL    GFR Estimate 80 >60 mL/min/1.73m2    Calcium 9.4 8.8 - 10.4 mg/dL    Glucose 259 (H) 70 - 99 mg/dL   Extra Purple Top Tube   Result Value Ref Range    Hold Specimen JIC        RADIOLOGY:  Reviewed all pertinent imaging. Please see official radiology report.  POC US ABDOMEN LIMITED    (Results Pending)       EKG:        PROCEDURES:   POC US ABDOMEN LIMITED    Date/Time: 4/22/2025 4:10 AM    Performed by: Sonny Aevry MD  Authorized by: Sonny Avery MD    Comments:      Polo catheter present inside bladder.  No bladder distention            IMike, am serving as a scribe to document services personally performed by Sonny Avery MD based on my observation and the provider's statements to me. I, Sonny Avery MD, attest that Mike Levi is acting in a scribe capacity, has observed my performance of the services and has documented them in accordance with my direction.    Sonny Avery MD  [facility] EMERGENCY DEPARTMENT  [address] [place] [id number] [phone]      Sonny Avery MD  04/22/25 6142

## 2025-04-24 LAB
PATH REPORT.COMMENTS IMP SPEC: ABNORMAL
PATH REPORT.COMMENTS IMP SPEC: YES
PATH REPORT.FINAL DX SPEC: ABNORMAL
PATH REPORT.GROSS SPEC: ABNORMAL
PATH REPORT.MICROSCOPIC SPEC OTHER STN: ABNORMAL
PATH REPORT.RELEVANT HX SPEC: ABNORMAL
PATHOLOGY SYNOPTIC REPORT: ABNORMAL
PHOTO IMAGE: ABNORMAL

## 2025-04-24 PROCEDURE — 88305 TISSUE EXAM BY PATHOLOGIST: CPT | Mod: 26 | Performed by: PATHOLOGY

## 2025-04-25 LAB
APPEARANCE STONE: NORMAL
COMPN STONE: NORMAL
SPECIMEN WT: NORMAL MG

## 2025-05-01 DIAGNOSIS — C67.8 MALIGNANT NEOPLASM OF OVERLAPPING SITES OF BLADDER (H): Primary | ICD-10-CM

## 2025-05-22 LAB
PATH REPORT.ADDENDUM SPEC: ABNORMAL
PATH REPORT.COMMENTS IMP SPEC: ABNORMAL
PATH REPORT.COMMENTS IMP SPEC: YES
PATH REPORT.FINAL DX SPEC: ABNORMAL
PATH REPORT.GROSS SPEC: ABNORMAL
PATH REPORT.MICROSCOPIC SPEC OTHER STN: ABNORMAL
PATH REPORT.RELEVANT HX SPEC: ABNORMAL
PATHOLOGY SYNOPTIC REPORT: ABNORMAL
PHOTO IMAGE: ABNORMAL

## 2025-06-16 LAB
BKR LAB AP ADD'L TEST STATUS: NORMAL
BKR LAB LINKED CASE OR SPECIMEN ID: NORMAL
BKR PATH ADDL TEST FINAL COMMENTS: NORMAL

## (undated) DEVICE — MAT FLOOR SURGICAL 40X38 0702140238

## (undated) DEVICE — INTRO SHEATH 8FRX45CM PINNACLE DESTINATION STR 54-84501

## (undated) DEVICE — CATH FOLEY COUDE 18FR 5ML LUBRICATH LATEX 0168L18

## (undated) DEVICE — TUBING IRR LG BORE TUBE DRIP CHMBR 2 BG 94IN 313003

## (undated) DEVICE — ADAPTER CATH CHECK-FLO 9FR FLL 050885 G15476

## (undated) DEVICE — ESU GROUND PAD ADULT REM W/15' CORD E7507DB

## (undated) DEVICE — ADAPTER CATH 403250

## (undated) DEVICE — PREP POVIDONE-IODINE 7.5% SCRUB 4OZ BOTTLE MDS093945

## (undated) DEVICE — CONTAINER URINE SPEC 4OZ STRL 1053

## (undated) DEVICE — TUBING SET THERMEDX UROLOGY SGL USE LL0006

## (undated) DEVICE — KIT ENDO FIRST STEP DISINFECTANT 200ML W/POUCH EP-4

## (undated) DEVICE — SOL NACL 0.9% IRRIG 3000ML BAG 2B7127

## (undated) DEVICE — SHEATH URETERAL ACCESS 12/14 28CM NAV HD 250-224

## (undated) DEVICE — STRAP POSITIONING DEVON VELCRO 60X4 IN KNEE LF 31143005

## (undated) DEVICE — LASER FIBER HOLMIUM MOSES 200 D/F/L AC-10030100

## (undated) DEVICE — SUCTION MANIFOLD NEPTUNE 2 SYS 1 PORT 702-025-000

## (undated) DEVICE — GUIDEWIRE BENTSON FLEX TIP 0.035"X150CM M0066201250

## (undated) DEVICE — MAT FLOOR WATERPROOF BACKSHEET FMBP30

## (undated) DEVICE — BASKET STONE RETRIEVAL NTNL ZERO TIP 1.9FRX90CM M0063901050

## (undated) DEVICE — BASKET STONE RETRIEVAL ZERO TIP 2.4FRX120CM M0063901010

## (undated) DEVICE — GLOVE SURG PI ULTRA TOUCH M SZ 7 LF 42670

## (undated) DEVICE — SOLUTION IRRIG 2B7127 .9NS 3000ML BAG

## (undated) DEVICE — TUBING SUCTION MEDI-VAC 1/4"X20' N620A

## (undated) DEVICE — SOL WATER IRRIG 1000ML BOTTLE 2F7114

## (undated) DEVICE — TUBING SUCTION MEDI-VAC 1/4"X20' N620A - HE

## (undated) DEVICE — SOL WATER IRRIG 3000ML BAG 2B7117

## (undated) DEVICE — Device

## (undated) DEVICE — CUSTOM PACK CYSTO PREFERRED SOT5BCYHEA

## (undated) DEVICE — PREP DYNA-HEX 4% CHG SCRUB 4OZ BOTTLE MDS098710

## (undated) RX ORDER — FENTANYL CITRATE-0.9 % NACL/PF 10 MCG/ML
PLASTIC BAG, INJECTION (ML) INTRAVENOUS
Status: DISPENSED
Start: 2025-04-21

## (undated) RX ORDER — FENTANYL CITRATE 50 UG/ML
INJECTION, SOLUTION INTRAMUSCULAR; INTRAVENOUS
Status: DISPENSED
Start: 2025-04-21

## (undated) RX ORDER — GLYCOPYRROLATE 0.2 MG/ML
INJECTION, SOLUTION INTRAMUSCULAR; INTRAVENOUS
Status: DISPENSED
Start: 2025-04-21